# Patient Record
Sex: MALE | Race: WHITE | Employment: PART TIME | URBAN - METROPOLITAN AREA
[De-identification: names, ages, dates, MRNs, and addresses within clinical notes are randomized per-mention and may not be internally consistent; named-entity substitution may affect disease eponyms.]

---

## 2023-04-23 DIAGNOSIS — M54.9 CHRONIC BACK PAIN, UNSPECIFIED BACK LOCATION, UNSPECIFIED BACK PAIN LATERALITY: ICD-10-CM

## 2023-04-23 DIAGNOSIS — I10 PRIMARY HYPERTENSION: Primary | ICD-10-CM

## 2023-04-23 DIAGNOSIS — G89.29 CHRONIC BACK PAIN, UNSPECIFIED BACK LOCATION, UNSPECIFIED BACK PAIN LATERALITY: ICD-10-CM

## 2023-04-23 DIAGNOSIS — E78.2 MIXED HYPERLIPIDEMIA: ICD-10-CM

## 2023-04-24 RX ORDER — MELOXICAM 15 MG/1
1 TABLET ORAL DAILY PRN
COMMUNITY
Start: 2020-04-06 | End: 2023-04-24 | Stop reason: ALTCHOICE

## 2023-04-24 RX ORDER — PNV NO.95/FERROUS FUM/FOLIC AC 28MG-0.8MG
TABLET ORAL
COMMUNITY
Start: 2020-04-06 | End: 2023-05-15 | Stop reason: ALTCHOICE

## 2023-04-24 RX ORDER — CALC/MAG/B COMPLEX/D3/HERB 61
TABLET ORAL EVERY 12 HOURS
COMMUNITY
Start: 2021-09-23 | End: 2024-04-29 | Stop reason: SDUPTHER

## 2023-04-24 RX ORDER — DICLOFENAC SODIUM 10 MG/G
GEL TOPICAL
COMMUNITY
Start: 2022-07-27

## 2023-04-24 RX ORDER — DILTIAZEM HYDROCHLORIDE 180 MG/1
CAPSULE, EXTENDED RELEASE ORAL
Qty: 90 CAPSULE | Refills: 0 | Status: SHIPPED | OUTPATIENT
Start: 2023-04-24 | End: 2023-05-15 | Stop reason: SDUPTHER

## 2023-04-24 RX ORDER — ATORVASTATIN CALCIUM 10 MG/1
1 TABLET, FILM COATED ORAL DAILY
COMMUNITY
Start: 2020-05-19 | End: 2023-04-24 | Stop reason: ALTCHOICE

## 2023-04-24 RX ORDER — ATORVASTATIN CALCIUM 10 MG/1
TABLET, FILM COATED ORAL
Qty: 90 TABLET | Refills: 0 | Status: SHIPPED | OUTPATIENT
Start: 2023-04-24 | End: 2023-05-15 | Stop reason: SDUPTHER

## 2023-04-24 RX ORDER — TURMERIC ROOT EXTRACT 500 MG
TABLET ORAL
COMMUNITY
Start: 2020-04-06

## 2023-04-24 RX ORDER — DILTIAZEM HYDROCHLORIDE 180 MG/1
1 CAPSULE, EXTENDED RELEASE ORAL DAILY
COMMUNITY
Start: 2020-04-06 | End: 2023-04-24 | Stop reason: ALTCHOICE

## 2023-04-24 RX ORDER — MELOXICAM 15 MG/1
TABLET ORAL
Qty: 30 TABLET | Refills: 0 | Status: SHIPPED | OUTPATIENT
Start: 2023-04-24 | End: 2023-05-15 | Stop reason: SDUPTHER

## 2023-04-24 RX ORDER — MULTIVITAMIN
TABLET ORAL
COMMUNITY
Start: 2020-04-06

## 2023-04-28 PROBLEM — M19.90 OSTEOARTHRITIS: Status: ACTIVE | Noted: 2023-04-28

## 2023-04-28 PROBLEM — H61.23 BILATERAL IMPACTED CERUMEN: Status: ACTIVE | Noted: 2023-04-28

## 2023-04-28 PROBLEM — G56.02 CARPAL TUNNEL SYNDROME OF LEFT WRIST: Status: ACTIVE | Noted: 2023-04-28

## 2023-04-28 PROBLEM — E66.9 OBESITY: Status: ACTIVE | Noted: 2023-04-28

## 2023-04-28 PROBLEM — R52 WHOLE BODY PAIN: Status: ACTIVE | Noted: 2023-04-28

## 2023-04-28 PROBLEM — K21.9 ESOPHAGEAL REFLUX: Status: ACTIVE | Noted: 2023-04-28

## 2023-04-28 PROBLEM — E78.2 HYPERLIPIDEMIA, MIXED: Status: ACTIVE | Noted: 2023-04-28

## 2023-04-28 PROBLEM — R53.81 CHRONIC FATIGUE AND MALAISE: Status: ACTIVE | Noted: 2023-04-28

## 2023-04-28 PROBLEM — M79.641 PAIN IN BOTH HANDS: Status: ACTIVE | Noted: 2023-04-28

## 2023-04-28 PROBLEM — I10 BENIGN ESSENTIAL HYPERTENSION: Status: ACTIVE | Noted: 2023-04-28

## 2023-04-28 PROBLEM — E04.9 ENLARGED THYROID: Status: ACTIVE | Noted: 2023-04-28

## 2023-04-28 PROBLEM — R25.2 BILATERAL LEG CRAMPS: Status: ACTIVE | Noted: 2023-04-28

## 2023-04-28 PROBLEM — M79.642 PAIN IN BOTH HANDS: Status: ACTIVE | Noted: 2023-04-28

## 2023-04-28 PROBLEM — F32.A DEPRESSION: Status: ACTIVE | Noted: 2023-04-28

## 2023-04-28 PROBLEM — J30.9 RHINITIS, ALLERGIC: Status: ACTIVE | Noted: 2023-04-28

## 2023-04-28 PROBLEM — R53.82 CHRONIC FATIGUE AND MALAISE: Status: ACTIVE | Noted: 2023-04-28

## 2023-05-15 ENCOUNTER — TELEPHONE (OUTPATIENT)
Dept: PRIMARY CARE | Facility: CLINIC | Age: 58
End: 2023-05-15

## 2023-05-15 ENCOUNTER — LAB (OUTPATIENT)
Dept: LAB | Facility: LAB | Age: 58
End: 2023-05-15
Payer: COMMERCIAL

## 2023-05-15 ENCOUNTER — OFFICE VISIT (OUTPATIENT)
Dept: PRIMARY CARE | Facility: CLINIC | Age: 58
End: 2023-05-15
Payer: COMMERCIAL

## 2023-05-15 VITALS
SYSTOLIC BLOOD PRESSURE: 129 MMHG | OXYGEN SATURATION: 95 % | HEART RATE: 69 BPM | TEMPERATURE: 96.8 F | DIASTOLIC BLOOD PRESSURE: 95 MMHG | HEIGHT: 77 IN | WEIGHT: 235 LBS | BODY MASS INDEX: 27.75 KG/M2

## 2023-05-15 DIAGNOSIS — M79.641 PAIN IN BOTH HANDS: Primary | ICD-10-CM

## 2023-05-15 DIAGNOSIS — E78.2 HYPERLIPIDEMIA, MIXED: ICD-10-CM

## 2023-05-15 DIAGNOSIS — F32.A DEPRESSION, UNSPECIFIED DEPRESSION TYPE: ICD-10-CM

## 2023-05-15 DIAGNOSIS — E78.2 MIXED HYPERLIPIDEMIA: ICD-10-CM

## 2023-05-15 DIAGNOSIS — Z12.5 PROSTATE CANCER SCREENING: ICD-10-CM

## 2023-05-15 DIAGNOSIS — F41.9 ANXIETY: ICD-10-CM

## 2023-05-15 DIAGNOSIS — R53.82 CHRONIC FATIGUE AND MALAISE: ICD-10-CM

## 2023-05-15 DIAGNOSIS — R73.03 PREDIABETES: ICD-10-CM

## 2023-05-15 DIAGNOSIS — I10 PRIMARY HYPERTENSION: Primary | ICD-10-CM

## 2023-05-15 DIAGNOSIS — I10 PRIMARY HYPERTENSION: ICD-10-CM

## 2023-05-15 DIAGNOSIS — R53.81 CHRONIC FATIGUE AND MALAISE: ICD-10-CM

## 2023-05-15 DIAGNOSIS — M79.642 PAIN IN BOTH HANDS: Primary | ICD-10-CM

## 2023-05-15 DIAGNOSIS — G89.29 CHRONIC BACK PAIN, UNSPECIFIED BACK LOCATION, UNSPECIFIED BACK PAIN LATERALITY: ICD-10-CM

## 2023-05-15 DIAGNOSIS — M54.9 CHRONIC BACK PAIN, UNSPECIFIED BACK LOCATION, UNSPECIFIED BACK PAIN LATERALITY: ICD-10-CM

## 2023-05-15 DIAGNOSIS — I10 BENIGN ESSENTIAL HYPERTENSION: ICD-10-CM

## 2023-05-15 LAB
ALANINE AMINOTRANSFERASE (SGPT) (U/L) IN SER/PLAS: 22 U/L (ref 10–52)
ALBUMIN (G/DL) IN SER/PLAS: 4.4 G/DL (ref 3.4–5)
ALKALINE PHOSPHATASE (U/L) IN SER/PLAS: 49 U/L (ref 33–120)
ANION GAP IN SER/PLAS: 10 MMOL/L (ref 10–20)
ASPARTATE AMINOTRANSFERASE (SGOT) (U/L) IN SER/PLAS: 18 U/L (ref 9–39)
BILIRUBIN TOTAL (MG/DL) IN SER/PLAS: 0.5 MG/DL (ref 0–1.2)
CALCIUM (MG/DL) IN SER/PLAS: 9.3 MG/DL (ref 8.6–10.3)
CARBON DIOXIDE, TOTAL (MMOL/L) IN SER/PLAS: 29 MMOL/L (ref 21–32)
CHLORIDE (MMOL/L) IN SER/PLAS: 104 MMOL/L (ref 98–107)
CHOLESTEROL (MG/DL) IN SER/PLAS: 146 MG/DL (ref 0–199)
CHOLESTEROL IN HDL (MG/DL) IN SER/PLAS: 44.1 MG/DL
CHOLESTEROL/HDL RATIO: 3.3
CREATININE (MG/DL) IN SER/PLAS: 0.91 MG/DL (ref 0.5–1.3)
ERYTHROCYTE DISTRIBUTION WIDTH (RATIO) BY AUTOMATED COUNT: 12.5 % (ref 11.5–14.5)
ERYTHROCYTE MEAN CORPUSCULAR HEMOGLOBIN CONCENTRATION (G/DL) BY AUTOMATED: 33.7 G/DL (ref 32–36)
ERYTHROCYTE MEAN CORPUSCULAR VOLUME (FL) BY AUTOMATED COUNT: 85 FL (ref 80–100)
ERYTHROCYTES (10*6/UL) IN BLOOD BY AUTOMATED COUNT: 5.6 X10E12/L (ref 4.5–5.9)
GFR MALE: >90 ML/MIN/1.73M2
GLUCOSE (MG/DL) IN SER/PLAS: 84 MG/DL (ref 74–99)
HEMATOCRIT (%) IN BLOOD BY AUTOMATED COUNT: 47.8 % (ref 41–52)
HEMOGLOBIN (G/DL) IN BLOOD: 16.1 G/DL (ref 13.5–17.5)
LDL: 66 MG/DL (ref 0–99)
LEUKOCYTES (10*3/UL) IN BLOOD BY AUTOMATED COUNT: 6.7 X10E9/L (ref 4.4–11.3)
PLATELETS (10*3/UL) IN BLOOD AUTOMATED COUNT: 263 X10E9/L (ref 150–450)
POTASSIUM (MMOL/L) IN SER/PLAS: 4.2 MMOL/L (ref 3.5–5.3)
PROSTATE SPECIFIC ANTIGEN,SCREEN: 0.3 NG/ML (ref 0–4)
PROTEIN TOTAL: 7 G/DL (ref 6.4–8.2)
SODIUM (MMOL/L) IN SER/PLAS: 139 MMOL/L (ref 136–145)
TRIGLYCERIDE (MG/DL) IN SER/PLAS: 181 MG/DL (ref 0–149)
UREA NITROGEN (MG/DL) IN SER/PLAS: 15 MG/DL (ref 6–23)
VLDL: 36 MG/DL (ref 0–40)

## 2023-05-15 PROCEDURE — 80061 LIPID PANEL: CPT

## 2023-05-15 PROCEDURE — 99214 OFFICE O/P EST MOD 30 MIN: CPT | Performed by: STUDENT IN AN ORGANIZED HEALTH CARE EDUCATION/TRAINING PROGRAM

## 2023-05-15 PROCEDURE — 80053 COMPREHEN METABOLIC PANEL: CPT

## 2023-05-15 PROCEDURE — 3074F SYST BP LT 130 MM HG: CPT | Performed by: STUDENT IN AN ORGANIZED HEALTH CARE EDUCATION/TRAINING PROGRAM

## 2023-05-15 PROCEDURE — 83036 HEMOGLOBIN GLYCOSYLATED A1C: CPT

## 2023-05-15 PROCEDURE — 84153 ASSAY OF PSA TOTAL: CPT

## 2023-05-15 PROCEDURE — 3080F DIAST BP >= 90 MM HG: CPT | Performed by: STUDENT IN AN ORGANIZED HEALTH CARE EDUCATION/TRAINING PROGRAM

## 2023-05-15 PROCEDURE — 36415 COLL VENOUS BLD VENIPUNCTURE: CPT

## 2023-05-15 PROCEDURE — 85027 COMPLETE CBC AUTOMATED: CPT

## 2023-05-15 RX ORDER — MELOXICAM 15 MG/1
1 TABLET ORAL DAILY
COMMUNITY
Start: 2019-02-14 | End: 2024-04-29 | Stop reason: ALTCHOICE

## 2023-05-15 RX ORDER — ALPRAZOLAM 0.25 MG/1
TABLET ORAL
COMMUNITY
Start: 2018-02-21 | End: 2024-04-29 | Stop reason: ALTCHOICE

## 2023-05-15 RX ORDER — SERTRALINE HYDROCHLORIDE 50 MG/1
50 TABLET, FILM COATED ORAL 2 TIMES DAILY
Qty: 90 TABLET | Refills: 1 | Status: CANCELLED | OUTPATIENT
Start: 2023-05-15

## 2023-05-15 RX ORDER — ATORVASTATIN CALCIUM 10 MG/1
10 TABLET, FILM COATED ORAL DAILY
Qty: 90 TABLET | Refills: 1 | Status: SHIPPED | OUTPATIENT
Start: 2023-05-15 | End: 2023-10-04

## 2023-05-15 RX ORDER — MELOXICAM 15 MG/1
15 TABLET ORAL DAILY
Qty: 90 TABLET | Refills: 1 | Status: CANCELLED | OUTPATIENT
Start: 2023-05-15

## 2023-05-15 RX ORDER — ATORVASTATIN CALCIUM 10 MG/1
10 TABLET, FILM COATED ORAL DAILY
Qty: 90 TABLET | Refills: 1 | Status: SHIPPED | OUTPATIENT
Start: 2023-05-15 | End: 2023-05-15 | Stop reason: SDUPTHER

## 2023-05-15 RX ORDER — DILTIAZEM HYDROCHLORIDE 180 MG/1
180 CAPSULE, EXTENDED RELEASE ORAL DAILY
Qty: 90 CAPSULE | Refills: 1 | Status: CANCELLED | OUTPATIENT
Start: 2023-05-15

## 2023-05-15 RX ORDER — MELOXICAM 15 MG/1
15 TABLET ORAL DAILY PRN
Qty: 90 TABLET | Refills: 1 | Status: SHIPPED | OUTPATIENT
Start: 2023-05-15 | End: 2023-10-04

## 2023-05-15 RX ORDER — GLUCOSAMINE/CHONDRO SU A 500-400 MG
1 TABLET ORAL EVERY OTHER DAY
COMMUNITY

## 2023-05-15 RX ORDER — SERTRALINE HYDROCHLORIDE 50 MG/1
100 TABLET, FILM COATED ORAL DAILY
Qty: 90 TABLET | Refills: 1 | Status: SHIPPED | OUTPATIENT
Start: 2023-05-15 | End: 2023-05-15 | Stop reason: ALTCHOICE

## 2023-05-15 RX ORDER — MELOXICAM 15 MG/1
15 TABLET ORAL DAILY PRN
Qty: 90 TABLET | Refills: 1 | Status: SHIPPED | OUTPATIENT
Start: 2023-05-15 | End: 2023-05-15 | Stop reason: ALTCHOICE

## 2023-05-15 RX ORDER — SERTRALINE HYDROCHLORIDE 50 MG/1
100 TABLET, FILM COATED ORAL DAILY
Qty: 90 TABLET | Refills: 1 | Status: SHIPPED | OUTPATIENT
Start: 2023-05-15 | End: 2023-08-10

## 2023-05-15 RX ORDER — DILTIAZEM HYDROCHLORIDE 180 MG/1
180 CAPSULE, EXTENDED RELEASE ORAL DAILY
Qty: 90 CAPSULE | Refills: 1 | Status: SHIPPED | OUTPATIENT
Start: 2023-05-15 | End: 2023-05-15 | Stop reason: ALTCHOICE

## 2023-05-15 RX ORDER — LANOLIN ALCOHOL/MO/W.PET/CERES
500 CREAM (GRAM) TOPICAL DAILY
COMMUNITY

## 2023-05-15 RX ORDER — SERTRALINE HYDROCHLORIDE 50 MG/1
1 TABLET, FILM COATED ORAL 2 TIMES DAILY
COMMUNITY
Start: 2018-01-08 | End: 2024-04-29 | Stop reason: ALTCHOICE

## 2023-05-15 RX ORDER — DILTIAZEM HYDROCHLORIDE 180 MG/1
1 CAPSULE, EXTENDED RELEASE ORAL DAILY
COMMUNITY
Start: 2018-01-24 | End: 2023-10-04 | Stop reason: ALTCHOICE

## 2023-05-15 RX ORDER — DILTIAZEM HYDROCHLORIDE 180 MG/1
180 CAPSULE, EXTENDED RELEASE ORAL DAILY
Qty: 90 CAPSULE | Refills: 1 | Status: SHIPPED | OUTPATIENT
Start: 2023-05-15 | End: 2023-10-04

## 2023-05-15 RX ORDER — ASCORBIC ACID 500 MG
TABLET,CHEWABLE ORAL
COMMUNITY
End: 2024-04-29 | Stop reason: ALTCHOICE

## 2023-05-15 RX ORDER — ATORVASTATIN CALCIUM 10 MG/1
10 TABLET, FILM COATED ORAL DAILY
Qty: 90 TABLET | Refills: 1 | Status: CANCELLED | OUTPATIENT
Start: 2023-05-15

## 2023-05-15 RX ORDER — PYRIDOXINE HCL (VITAMIN B6) 250 MG
50 TABLET ORAL DAILY
COMMUNITY

## 2023-05-15 SDOH — ECONOMIC STABILITY: FOOD INSECURITY: WITHIN THE PAST 12 MONTHS, THE FOOD YOU BOUGHT JUST DIDN'T LAST AND YOU DIDN'T HAVE MONEY TO GET MORE.: NEVER TRUE

## 2023-05-15 SDOH — ECONOMIC STABILITY: FOOD INSECURITY: WITHIN THE PAST 12 MONTHS, YOU WORRIED THAT YOUR FOOD WOULD RUN OUT BEFORE YOU GOT MONEY TO BUY MORE.: NEVER TRUE

## 2023-05-15 ASSESSMENT — ENCOUNTER SYMPTOMS
FATIGUE: 0
NAUSEA: 0
COLOR CHANGE: 0
CONFUSION: 0
UNEXPECTED WEIGHT CHANGE: 0
ABDOMINAL PAIN: 0
PALPITATIONS: 0
OCCASIONAL FEELINGS OF UNSTEADINESS: 0
DIARRHEA: 0
HEADACHES: 0
CHILLS: 0
SHORTNESS OF BREATH: 0
CONSTIPATION: 0
DIZZINESS: 0
WHEEZING: 0
COUGH: 0
FEVER: 0
MUSCULOSKELETAL NEGATIVE: 1
VOMITING: 0

## 2023-05-15 ASSESSMENT — LIFESTYLE VARIABLES
HOW MANY STANDARD DRINKS CONTAINING ALCOHOL DO YOU HAVE ON A TYPICAL DAY: 3 OR 4
HOW OFTEN DO YOU HAVE SIX OR MORE DRINKS ON ONE OCCASION: NEVER
SKIP TO QUESTIONS 9-10: 0
HOW OFTEN DO YOU HAVE A DRINK CONTAINING ALCOHOL: 2-4 TIMES A MONTH
AUDIT-C TOTAL SCORE: 3

## 2023-05-15 ASSESSMENT — PATIENT HEALTH QUESTIONNAIRE - PHQ9
1. LITTLE INTEREST OR PLEASURE IN DOING THINGS: NOT AT ALL
SUM OF ALL RESPONSES TO PHQ9 QUESTIONS 1 & 2: 0
2. FEELING DOWN, DEPRESSED OR HOPELESS: NOT AT ALL

## 2023-05-15 NOTE — TELEPHONE ENCOUNTER
Pt's wife called in and stated that scripts that were called in during appointment on 5/15 were sent to wrong pharmacy. Scripts need to be sent to [facility] and for 90 days.

## 2023-05-16 LAB
ESTIMATED AVERAGE GLUCOSE FOR HBA1C: 128 MG/DL
HEMOGLOBIN A1C/HEMOGLOBIN TOTAL IN BLOOD: 6.1 %

## 2023-08-09 DIAGNOSIS — F41.9 ANXIETY: ICD-10-CM

## 2023-08-10 RX ORDER — SERTRALINE HYDROCHLORIDE 50 MG/1
100 TABLET, FILM COATED ORAL DAILY
Qty: 90 TABLET | Refills: 7 | Status: SHIPPED | OUTPATIENT
Start: 2023-08-10 | End: 2024-04-29 | Stop reason: SDUPTHER

## 2023-10-02 DIAGNOSIS — M54.9 CHRONIC BACK PAIN, UNSPECIFIED BACK LOCATION, UNSPECIFIED BACK PAIN LATERALITY: ICD-10-CM

## 2023-10-02 DIAGNOSIS — G89.29 CHRONIC BACK PAIN, UNSPECIFIED BACK LOCATION, UNSPECIFIED BACK PAIN LATERALITY: ICD-10-CM

## 2023-10-03 DIAGNOSIS — I10 PRIMARY HYPERTENSION: ICD-10-CM

## 2023-10-03 DIAGNOSIS — E78.2 MIXED HYPERLIPIDEMIA: ICD-10-CM

## 2023-10-04 RX ORDER — DILTIAZEM HYDROCHLORIDE 180 MG/1
180 CAPSULE, EXTENDED RELEASE ORAL DAILY
Qty: 90 CAPSULE | Refills: 0 | Status: SHIPPED | OUTPATIENT
Start: 2023-10-04 | End: 2024-04-29 | Stop reason: SDUPTHER

## 2023-10-04 RX ORDER — MELOXICAM 15 MG/1
TABLET ORAL
Qty: 90 TABLET | Refills: 0 | Status: SHIPPED | OUTPATIENT
Start: 2023-10-04 | End: 2024-04-29 | Stop reason: SDUPTHER

## 2023-10-04 RX ORDER — ATORVASTATIN CALCIUM 10 MG/1
10 TABLET, FILM COATED ORAL DAILY
Qty: 90 TABLET | Refills: 0 | Status: SHIPPED | OUTPATIENT
Start: 2023-10-04 | End: 2024-04-29 | Stop reason: SDUPTHER

## 2023-11-20 ENCOUNTER — APPOINTMENT (OUTPATIENT)
Dept: PRIMARY CARE | Facility: CLINIC | Age: 58
End: 2023-11-20
Payer: COMMERCIAL

## 2023-12-07 LAB
NONINV COLON CA DNA+OCC BLD SCRN STL QL: NEGATIVE
NONINV COLON CA DNA+OCC BLD SCRN STL-IMP: NORMAL

## 2024-04-29 ENCOUNTER — OFFICE VISIT (OUTPATIENT)
Dept: PRIMARY CARE | Facility: CLINIC | Age: 59
End: 2024-04-29
Payer: COMMERCIAL

## 2024-04-29 ENCOUNTER — APPOINTMENT (OUTPATIENT)
Dept: PRIMARY CARE | Facility: CLINIC | Age: 59
End: 2024-04-29
Payer: COMMERCIAL

## 2024-04-29 VITALS
SYSTOLIC BLOOD PRESSURE: 134 MMHG | DIASTOLIC BLOOD PRESSURE: 82 MMHG | RESPIRATION RATE: 16 BRPM | HEIGHT: 72 IN | HEART RATE: 87 BPM | TEMPERATURE: 97.8 F | WEIGHT: 233 LBS | OXYGEN SATURATION: 95 % | BODY MASS INDEX: 31.56 KG/M2

## 2024-04-29 DIAGNOSIS — M54.9 CHRONIC BACK PAIN, UNSPECIFIED BACK LOCATION, UNSPECIFIED BACK PAIN LATERALITY: ICD-10-CM

## 2024-04-29 DIAGNOSIS — R53.83 FATIGUE, UNSPECIFIED TYPE: ICD-10-CM

## 2024-04-29 DIAGNOSIS — E78.2 MIXED HYPERLIPIDEMIA: ICD-10-CM

## 2024-04-29 DIAGNOSIS — I10 PRIMARY HYPERTENSION: Primary | ICD-10-CM

## 2024-04-29 DIAGNOSIS — G89.29 CHRONIC BACK PAIN, UNSPECIFIED BACK LOCATION, UNSPECIFIED BACK PAIN LATERALITY: ICD-10-CM

## 2024-04-29 DIAGNOSIS — F41.9 ANXIETY: ICD-10-CM

## 2024-04-29 DIAGNOSIS — R73.03 PREDIABETES: ICD-10-CM

## 2024-04-29 DIAGNOSIS — K21.9 GASTROESOPHAGEAL REFLUX DISEASE, UNSPECIFIED WHETHER ESOPHAGITIS PRESENT: ICD-10-CM

## 2024-04-29 PROCEDURE — 3079F DIAST BP 80-89 MM HG: CPT | Performed by: STUDENT IN AN ORGANIZED HEALTH CARE EDUCATION/TRAINING PROGRAM

## 2024-04-29 PROCEDURE — 99214 OFFICE O/P EST MOD 30 MIN: CPT | Performed by: STUDENT IN AN ORGANIZED HEALTH CARE EDUCATION/TRAINING PROGRAM

## 2024-04-29 PROCEDURE — 3075F SYST BP GE 130 - 139MM HG: CPT | Performed by: STUDENT IN AN ORGANIZED HEALTH CARE EDUCATION/TRAINING PROGRAM

## 2024-04-29 RX ORDER — DILTIAZEM HYDROCHLORIDE 180 MG/1
180 CAPSULE, EXTENDED RELEASE ORAL DAILY
Qty: 90 CAPSULE | Refills: 1 | Status: SHIPPED | OUTPATIENT
Start: 2024-04-29

## 2024-04-29 RX ORDER — LANSOPRAZOLE 30 MG/1
30 CAPSULE, DELAYED RELEASE ORAL
Qty: 90 CAPSULE | Refills: 1 | Status: SHIPPED | OUTPATIENT
Start: 2024-04-29

## 2024-04-29 RX ORDER — SERTRALINE HYDROCHLORIDE 100 MG/1
100 TABLET, FILM COATED ORAL DAILY
Qty: 90 TABLET | Refills: 1 | Status: SHIPPED | OUTPATIENT
Start: 2024-04-29

## 2024-04-29 RX ORDER — MELOXICAM 15 MG/1
TABLET ORAL
Qty: 90 TABLET | Refills: 0 | Status: SHIPPED | OUTPATIENT
Start: 2024-04-29

## 2024-04-29 RX ORDER — ATORVASTATIN CALCIUM 10 MG/1
10 TABLET, FILM COATED ORAL DAILY
Qty: 90 TABLET | Refills: 0 | Status: SHIPPED | OUTPATIENT
Start: 2024-04-29

## 2024-04-29 SDOH — ECONOMIC STABILITY: FOOD INSECURITY: WITHIN THE PAST 12 MONTHS, THE FOOD YOU BOUGHT JUST DIDN'T LAST AND YOU DIDN'T HAVE MONEY TO GET MORE.: NEVER TRUE

## 2024-04-29 SDOH — ECONOMIC STABILITY: FOOD INSECURITY: WITHIN THE PAST 12 MONTHS, YOU WORRIED THAT YOUR FOOD WOULD RUN OUT BEFORE YOU GOT MONEY TO BUY MORE.: NEVER TRUE

## 2024-04-29 ASSESSMENT — ENCOUNTER SYMPTOMS
PALPITATIONS: 0
DIARRHEA: 0
WHEEZING: 0
SHORTNESS OF BREATH: 0
HEADACHES: 0
VOMITING: 0
ABDOMINAL PAIN: 1
COLOR CHANGE: 0
ARTHRALGIAS: 1
CONFUSION: 0
UNEXPECTED WEIGHT CHANGE: 0
DIZZINESS: 0
FATIGUE: 0
CHILLS: 0
NAUSEA: 0
CONSTIPATION: 0
FEVER: 1
COUGH: 0

## 2024-04-29 ASSESSMENT — PAIN SCALES - GENERAL: PAINLEVEL: 0-NO PAIN

## 2024-04-29 ASSESSMENT — LIFESTYLE VARIABLES
HOW MANY STANDARD DRINKS CONTAINING ALCOHOL DO YOU HAVE ON A TYPICAL DAY: 1 OR 2
HOW OFTEN DO YOU HAVE A DRINK CONTAINING ALCOHOL: 2-4 TIMES A MONTH
SKIP TO QUESTIONS 9-10: 1
AUDIT-C TOTAL SCORE: 2
HOW OFTEN DO YOU HAVE SIX OR MORE DRINKS ON ONE OCCASION: NEVER

## 2024-04-29 ASSESSMENT — PATIENT HEALTH QUESTIONNAIRE - PHQ9
2. FEELING DOWN, DEPRESSED OR HOPELESS: NOT AT ALL
1. LITTLE INTEREST OR PLEASURE IN DOING THINGS: NOT AT ALL
SUM OF ALL RESPONSES TO PHQ9 QUESTIONS 1 & 2: 0

## 2024-04-29 NOTE — PROGRESS NOTES
Subjective   Patient ID: Kamille Cotto Sr. is a 58 y.o. male who presents for Follow-up (Patient is here for a follow up and med refills.  Patient is still having fatigue issues.), Earache (Patient is having an issue with right ear.), and GI Problem (Pt is having a sharp pain in stomach.  This has been going on for the past 2 months.).    HPI   He is here for Fu visit & few complaints. Accompanied by wife - Melvina.   Reports having general fatigue, feels low energy level. Also reports R ear issues, feels something there; tried debrox w/ some help but cam back again. Wife states his hearing has been worsened however pt feels fine, declined seeing audiologist for now.   Reports sharp pain on his abd, most on the R side abd usually after meal; had cholecystectomy 4 yrs ago. Comes on & off & not regular. Denies N/V, diarrhea.      # HLD # HTN # SVT   - io BP was 134/82 with HR 87      - takes diltZ 180 mg daily, req refills   - takes atorva 10 mg daily w/o issues      # Anxiety/Depression   - reports stable mood, at LOV sertraline was inc to 100 mg.    - denies SI/HI and other concerns      # chronic joint pain   - reports his chronic pain on his hands and some times the knee but its sl better now as its getting warmer   - takes mobic 15 mg daily prn w/ help. Uses volteran gel as needed        Review of Systems   Constitutional:  Positive for fever. Negative for chills, fatigue and unexpected weight change.   HENT: Negative.     Respiratory:  Negative for cough, shortness of breath and wheezing.    Cardiovascular:  Negative for chest pain, palpitations and leg swelling.   Gastrointestinal:  Positive for abdominal pain. Negative for constipation, diarrhea, nausea and vomiting.   Musculoskeletal:  Positive for arthralgias.   Skin:  Negative for color change and rash.   Neurological:  Negative for dizziness and headaches.   Psychiatric/Behavioral:  Negative for behavioral problems and confusion.        Objective   BP  134/82 (BP Location: Right arm, Patient Position: Sitting, BP Cuff Size: Adult)   Pulse 87   Temp 36.6 °C (97.8 °F) (Temporal)   Resp 16   Ht 1.829 m (6')   Wt 106 kg (233 lb)   SpO2 95%   BMI 31.60 kg/m²     Physical Exam  Vitals and nursing note reviewed.   Constitutional:       Appearance: Normal appearance. He is obese.      Comments: Wife in the room.    HENT:      Right Ear: Tympanic membrane and ear canal normal.      Left Ear: Tympanic membrane and ear canal normal.   Cardiovascular:      Rate and Rhythm: Normal rate and regular rhythm.      Pulses: Normal pulses.      Heart sounds: Normal heart sounds.   Pulmonary:      Effort: Pulmonary effort is normal. No respiratory distress.      Breath sounds: Normal breath sounds.   Abdominal:      General: Abdomen is flat. Bowel sounds are normal.      Palpations: Abdomen is soft.      Tenderness: There is no abdominal tenderness. There is no right CVA tenderness, left CVA tenderness, guarding or rebound.      Hernia: No hernia is present.   Musculoskeletal:         General: Normal range of motion.   Neurological:      General: No focal deficit present.      Mental Status: He is alert.   Psychiatric:         Mood and Affect: Mood normal.         Behavior: Behavior normal.       Assessment/Plan   He is here for Fu visit & few complaints. Accompanied by wife - Melvina.   Having generalized fatigue, could be metabolic vs other as depression; obtain bld work to eval further. Bl ears appeared normal, no signs of acute infections. Declined audiologist visit.   He abd exam pretty normal, not ttp and hernia/mass felt during the exam; could be from gastritis vs other; cont prevacid 30 mg dialy; rx renewed (if not covered, will cont OTC 15 mg 2 caps daily). May consider abd US/CT if not better/other at NOV or sooner as needed. Other chronic problems been stable as below.      # HLD # HTN # SVT   - BP remains controlled       - cont diltZ 180 mg daily as usual   - cont  atorva 10 mg daily   - encourage heart healthy & DASH diet; continue exercise as tolerated, at least 150 mins per wk   - BW per emr, will call for any abn results as indicated; pt made aware      # Anxiety/Depression   -stable mood on sertraline 100 mg daily, cont same.   - no SI/HI noted      # chronic joint pain   - pain stable on mobic 15 mg daily as needed; cont same. Cont volteran gel as needed     Problem List Items Addressed This Visit             ICD-10-CM    Esophageal reflux K21.9    Relevant Medications    lansoprazole (Prevacid) 30 mg DR capsule     Other Visit Diagnoses         Codes    Primary hypertension    -  Primary I10    Relevant Medications    dilTIAZem XR (DILT-XR) 180 mg 24 hr capsule    Other Relevant Orders    Comprehensive Metabolic Panel    CBC and Auto Differential    Anxiety     F41.9    Relevant Medications    sertraline (Zoloft) 100 mg tablet    Mixed hyperlipidemia     E78.2    Relevant Medications    atorvastatin (Lipitor) 10 mg tablet    Other Relevant Orders    Lipid Panel    Chronic back pain, unspecified back location, unspecified back pain laterality     M54.9, G89.29    Relevant Medications    meloxicam (Mobic) 15 mg tablet    Prediabetes     R73.03    Relevant Orders    Hemoglobin A1c    Fatigue, unspecified type     R53.83    Relevant Orders    Tsh With Reflex To Free T4 If Abnormal    Vitamin B12          Rtc 5-6 mo for hm/FU    Ricardo Ramírez MD    [facility], [facility]

## 2024-04-30 ENCOUNTER — LAB (OUTPATIENT)
Dept: LAB | Facility: LAB | Age: 59
End: 2024-04-30
Payer: COMMERCIAL

## 2024-04-30 DIAGNOSIS — I10 PRIMARY HYPERTENSION: ICD-10-CM

## 2024-04-30 DIAGNOSIS — R53.83 FATIGUE, UNSPECIFIED TYPE: ICD-10-CM

## 2024-04-30 DIAGNOSIS — R73.03 PREDIABETES: ICD-10-CM

## 2024-04-30 DIAGNOSIS — E78.2 MIXED HYPERLIPIDEMIA: ICD-10-CM

## 2024-04-30 LAB
ALBUMIN SERPL BCP-MCNC: 4.4 G/DL (ref 3.4–5)
ALP SERPL-CCNC: 46 U/L (ref 33–120)
ALT SERPL W P-5'-P-CCNC: 22 U/L (ref 10–52)
ANION GAP SERPL CALC-SCNC: 12 MMOL/L (ref 10–20)
AST SERPL W P-5'-P-CCNC: 19 U/L (ref 9–39)
BASOPHILS # BLD AUTO: 0.05 X10*3/UL (ref 0–0.1)
BASOPHILS NFR BLD AUTO: 0.8 %
BILIRUB SERPL-MCNC: 0.5 MG/DL (ref 0–1.2)
BUN SERPL-MCNC: 12 MG/DL (ref 6–23)
CALCIUM SERPL-MCNC: 9.4 MG/DL (ref 8.6–10.3)
CHLORIDE SERPL-SCNC: 105 MMOL/L (ref 98–107)
CHOLEST SERPL-MCNC: 145 MG/DL (ref 0–199)
CHOLESTEROL/HDL RATIO: 3.8
CO2 SERPL-SCNC: 25 MMOL/L (ref 21–32)
CREAT SERPL-MCNC: 1.03 MG/DL (ref 0.5–1.3)
EGFRCR SERPLBLD CKD-EPI 2021: 84 ML/MIN/1.73M*2
EOSINOPHIL # BLD AUTO: 0.06 X10*3/UL (ref 0–0.7)
EOSINOPHIL NFR BLD AUTO: 1 %
ERYTHROCYTE [DISTWIDTH] IN BLOOD BY AUTOMATED COUNT: 12.6 % (ref 11.5–14.5)
GLUCOSE SERPL-MCNC: 122 MG/DL (ref 74–99)
HCT VFR BLD AUTO: 47.3 % (ref 41–52)
HDLC SERPL-MCNC: 38.3 MG/DL
HGB BLD-MCNC: 16.3 G/DL (ref 13.5–17.5)
IMM GRANULOCYTES # BLD AUTO: 0.04 X10*3/UL (ref 0–0.7)
IMM GRANULOCYTES NFR BLD AUTO: 0.7 % (ref 0–0.9)
LDLC SERPL CALC-MCNC: 63 MG/DL
LYMPHOCYTES # BLD AUTO: 1.74 X10*3/UL (ref 1.2–4.8)
LYMPHOCYTES NFR BLD AUTO: 29.4 %
MCH RBC QN AUTO: 29.2 PG (ref 26–34)
MCHC RBC AUTO-ENTMCNC: 34.5 G/DL (ref 32–36)
MCV RBC AUTO: 85 FL (ref 80–100)
MONOCYTES # BLD AUTO: 0.48 X10*3/UL (ref 0.1–1)
MONOCYTES NFR BLD AUTO: 8.1 %
NEUTROPHILS # BLD AUTO: 3.55 X10*3/UL (ref 1.2–7.7)
NEUTROPHILS NFR BLD AUTO: 60 %
NON HDL CHOLESTEROL: 107 MG/DL (ref 0–149)
NRBC BLD-RTO: 0 /100 WBCS (ref 0–0)
PLATELET # BLD AUTO: 250 X10*3/UL (ref 150–450)
POTASSIUM SERPL-SCNC: 4.1 MMOL/L (ref 3.5–5.3)
PROT SERPL-MCNC: 7.1 G/DL (ref 6.4–8.2)
RBC # BLD AUTO: 5.59 X10*6/UL (ref 4.5–5.9)
SODIUM SERPL-SCNC: 138 MMOL/L (ref 136–145)
TRIGL SERPL-MCNC: 219 MG/DL (ref 0–149)
TSH SERPL-ACNC: 0.82 MIU/L (ref 0.44–3.98)
VIT B12 SERPL-MCNC: 414 PG/ML (ref 211–911)
VLDL: 44 MG/DL (ref 0–40)
WBC # BLD AUTO: 5.9 X10*3/UL (ref 4.4–11.3)

## 2024-04-30 PROCEDURE — 80053 COMPREHEN METABOLIC PANEL: CPT

## 2024-04-30 PROCEDURE — 36415 COLL VENOUS BLD VENIPUNCTURE: CPT

## 2024-04-30 PROCEDURE — 84443 ASSAY THYROID STIM HORMONE: CPT

## 2024-04-30 PROCEDURE — 85025 COMPLETE CBC W/AUTO DIFF WBC: CPT

## 2024-04-30 PROCEDURE — 83036 HEMOGLOBIN GLYCOSYLATED A1C: CPT

## 2024-04-30 PROCEDURE — 82607 VITAMIN B-12: CPT

## 2024-04-30 PROCEDURE — 80061 LIPID PANEL: CPT

## 2024-05-02 LAB
EST. AVERAGE GLUCOSE BLD GHB EST-MCNC: 123 MG/DL
HBA1C MFR BLD: 5.9 %

## 2024-06-23 DIAGNOSIS — E78.2 MIXED HYPERLIPIDEMIA: ICD-10-CM

## 2024-06-23 DIAGNOSIS — M54.9 CHRONIC BACK PAIN, UNSPECIFIED BACK LOCATION, UNSPECIFIED BACK PAIN LATERALITY: ICD-10-CM

## 2024-06-23 DIAGNOSIS — G89.29 CHRONIC BACK PAIN, UNSPECIFIED BACK LOCATION, UNSPECIFIED BACK PAIN LATERALITY: ICD-10-CM

## 2024-06-25 RX ORDER — ATORVASTATIN CALCIUM 10 MG/1
10 TABLET, FILM COATED ORAL DAILY
Qty: 90 TABLET | Refills: 1 | Status: SHIPPED | OUTPATIENT
Start: 2024-06-25

## 2024-06-25 RX ORDER — MELOXICAM 15 MG/1
TABLET ORAL
Qty: 90 TABLET | Refills: 0 | Status: SHIPPED | OUTPATIENT
Start: 2024-06-25

## 2024-09-03 DIAGNOSIS — M54.9 CHRONIC BACK PAIN, UNSPECIFIED BACK LOCATION, UNSPECIFIED BACK PAIN LATERALITY: ICD-10-CM

## 2024-09-03 DIAGNOSIS — G89.29 CHRONIC BACK PAIN, UNSPECIFIED BACK LOCATION, UNSPECIFIED BACK PAIN LATERALITY: ICD-10-CM

## 2024-09-04 RX ORDER — MELOXICAM 15 MG/1
TABLET ORAL
Qty: 90 TABLET | Refills: 0 | Status: SHIPPED | OUTPATIENT
Start: 2024-09-04

## 2024-09-16 DIAGNOSIS — I10 PRIMARY HYPERTENSION: ICD-10-CM

## 2024-09-16 DIAGNOSIS — F41.9 ANXIETY: ICD-10-CM

## 2024-10-02 RX ORDER — SERTRALINE HYDROCHLORIDE 100 MG/1
100 TABLET, FILM COATED ORAL DAILY
Qty: 90 TABLET | Refills: 0 | Status: SHIPPED | OUTPATIENT
Start: 2024-10-02

## 2024-10-02 RX ORDER — DILTIAZEM HYDROCHLORIDE 180 MG/1
180 CAPSULE, EXTENDED RELEASE ORAL DAILY
Qty: 90 CAPSULE | Refills: 0 | Status: SHIPPED | OUTPATIENT
Start: 2024-10-02

## 2024-10-21 ENCOUNTER — APPOINTMENT (OUTPATIENT)
Dept: PRIMARY CARE | Facility: CLINIC | Age: 59
End: 2024-10-21
Payer: COMMERCIAL

## 2024-10-21 VITALS
SYSTOLIC BLOOD PRESSURE: 118 MMHG | HEIGHT: 72 IN | OXYGEN SATURATION: 95 % | DIASTOLIC BLOOD PRESSURE: 76 MMHG | BODY MASS INDEX: 31.34 KG/M2 | WEIGHT: 231.4 LBS | TEMPERATURE: 97.1 F | HEART RATE: 83 BPM | RESPIRATION RATE: 20 BRPM

## 2024-10-21 DIAGNOSIS — I10 PRIMARY HYPERTENSION: ICD-10-CM

## 2024-10-21 DIAGNOSIS — E78.2 MIXED HYPERLIPIDEMIA: ICD-10-CM

## 2024-10-21 DIAGNOSIS — F41.9 ANXIETY: ICD-10-CM

## 2024-10-21 DIAGNOSIS — G89.29 CHRONIC LEFT HIP PAIN: ICD-10-CM

## 2024-10-21 DIAGNOSIS — M25.552 CHRONIC LEFT HIP PAIN: ICD-10-CM

## 2024-10-21 DIAGNOSIS — G89.29 CHRONIC BACK PAIN, UNSPECIFIED BACK LOCATION, UNSPECIFIED BACK PAIN LATERALITY: ICD-10-CM

## 2024-10-21 DIAGNOSIS — R73.03 PREDIABETES: ICD-10-CM

## 2024-10-21 DIAGNOSIS — Z00.00 ROUTINE GENERAL MEDICAL EXAMINATION AT A HEALTH CARE FACILITY: Primary | ICD-10-CM

## 2024-10-21 DIAGNOSIS — M54.9 CHRONIC BACK PAIN, UNSPECIFIED BACK LOCATION, UNSPECIFIED BACK PAIN LATERALITY: ICD-10-CM

## 2024-10-21 DIAGNOSIS — Z12.5 PROSTATE CANCER SCREENING: ICD-10-CM

## 2024-10-21 DIAGNOSIS — K21.9 GASTROESOPHAGEAL REFLUX DISEASE, UNSPECIFIED WHETHER ESOPHAGITIS PRESENT: ICD-10-CM

## 2024-10-21 PROBLEM — U07.1 DISEASE DUE TO SEVERE ACUTE RESPIRATORY SYNDROME CORONAVIRUS 2 (SARS-COV-2): Status: ACTIVE | Noted: 2024-10-21

## 2024-10-21 PROBLEM — M79.643 PAIN OF HAND: Status: ACTIVE | Noted: 2024-10-21

## 2024-10-21 PROBLEM — Z86.79 HISTORY OF PAROXYSMAL SUPRAVENTRICULAR TACHYCARDIA: Status: ACTIVE | Noted: 2024-10-21

## 2024-10-21 PROBLEM — M17.0 PRIMARY OSTEOARTHRITIS OF BOTH KNEES: Status: ACTIVE | Noted: 2024-10-21

## 2024-10-21 PROBLEM — M54.30 SCIATICA: Status: ACTIVE | Noted: 2018-12-22

## 2024-10-21 PROCEDURE — 3074F SYST BP LT 130 MM HG: CPT | Performed by: STUDENT IN AN ORGANIZED HEALTH CARE EDUCATION/TRAINING PROGRAM

## 2024-10-21 PROCEDURE — 99396 PREV VISIT EST AGE 40-64: CPT | Performed by: STUDENT IN AN ORGANIZED HEALTH CARE EDUCATION/TRAINING PROGRAM

## 2024-10-21 PROCEDURE — 3008F BODY MASS INDEX DOCD: CPT | Performed by: STUDENT IN AN ORGANIZED HEALTH CARE EDUCATION/TRAINING PROGRAM

## 2024-10-21 PROCEDURE — 99214 OFFICE O/P EST MOD 30 MIN: CPT | Performed by: STUDENT IN AN ORGANIZED HEALTH CARE EDUCATION/TRAINING PROGRAM

## 2024-10-21 PROCEDURE — 3078F DIAST BP <80 MM HG: CPT | Performed by: STUDENT IN AN ORGANIZED HEALTH CARE EDUCATION/TRAINING PROGRAM

## 2024-10-21 RX ORDER — ATORVASTATIN CALCIUM 10 MG/1
10 TABLET, FILM COATED ORAL DAILY
Qty: 90 TABLET | Refills: 1 | Status: SHIPPED | OUTPATIENT
Start: 2024-10-21

## 2024-10-21 RX ORDER — SERTRALINE HYDROCHLORIDE 50 MG/1
50 TABLET, FILM COATED ORAL DAILY
Qty: 90 TABLET | Refills: 1 | Status: SHIPPED | OUTPATIENT
Start: 2024-10-21

## 2024-10-21 RX ORDER — LANSOPRAZOLE 30 MG/1
30 CAPSULE, DELAYED RELEASE ORAL
Qty: 90 CAPSULE | Refills: 1 | Status: SHIPPED | OUTPATIENT
Start: 2024-10-21

## 2024-10-21 RX ORDER — MELOXICAM 15 MG/1
TABLET ORAL
Qty: 90 TABLET | Refills: 0 | Status: SHIPPED | OUTPATIENT
Start: 2024-10-21

## 2024-10-21 RX ORDER — DICLOFENAC SODIUM 10 MG/G
2 GEL TOPICAL 4 TIMES DAILY PRN
Qty: 200 G | Refills: 1 | Status: SHIPPED | OUTPATIENT
Start: 2024-10-21

## 2024-10-21 RX ORDER — DILTIAZEM HYDROCHLORIDE 180 MG/1
180 CAPSULE, EXTENDED RELEASE ORAL DAILY
Qty: 90 CAPSULE | Refills: 1 | Status: SHIPPED | OUTPATIENT
Start: 2024-10-21

## 2024-10-21 SDOH — ECONOMIC STABILITY: FOOD INSECURITY: WITHIN THE PAST 12 MONTHS, YOU WORRIED THAT YOUR FOOD WOULD RUN OUT BEFORE YOU GOT MONEY TO BUY MORE.: NEVER TRUE

## 2024-10-21 SDOH — ECONOMIC STABILITY: FOOD INSECURITY: WITHIN THE PAST 12 MONTHS, THE FOOD YOU BOUGHT JUST DIDN'T LAST AND YOU DIDN'T HAVE MONEY TO GET MORE.: NEVER TRUE

## 2024-10-21 ASSESSMENT — ANXIETY QUESTIONNAIRES
5. BEING SO RESTLESS THAT IT IS HARD TO SIT STILL: NOT AT ALL
4. TROUBLE RELAXING: NOT AT ALL
GAD7 TOTAL SCORE: 0
6. BECOMING EASILY ANNOYED OR IRRITABLE: NOT AT ALL
1. FEELING NERVOUS, ANXIOUS, OR ON EDGE: NOT AT ALL
IF YOU CHECKED OFF ANY PROBLEMS ON THIS QUESTIONNAIRE, HOW DIFFICULT HAVE THESE PROBLEMS MADE IT FOR YOU TO DO YOUR WORK, TAKE CARE OF THINGS AT HOME, OR GET ALONG WITH OTHER PEOPLE: NOT DIFFICULT AT ALL
2. NOT BEING ABLE TO STOP OR CONTROL WORRYING: NOT AT ALL
3. WORRYING TOO MUCH ABOUT DIFFERENT THINGS: NOT AT ALL
7. FEELING AFRAID AS IF SOMETHING AWFUL MIGHT HAPPEN: NOT AT ALL

## 2024-10-21 ASSESSMENT — ENCOUNTER SYMPTOMS
PALPITATIONS: 0
NAUSEA: 0
COLOR CHANGE: 0
LOSS OF SENSATION IN FEET: 0
OCCASIONAL FEELINGS OF UNSTEADINESS: 0
DIARRHEA: 0
VOMITING: 0
HEADACHES: 0
UNEXPECTED WEIGHT CHANGE: 0
COUGH: 0
SHORTNESS OF BREATH: 0
FEVER: 0
WHEEZING: 0
CHILLS: 0
ABDOMINAL PAIN: 0
FATIGUE: 0
DEPRESSION: 0
BACK PAIN: 1
DIZZINESS: 0
CONFUSION: 0
CONSTIPATION: 0

## 2024-10-21 ASSESSMENT — LIFESTYLE VARIABLES
HOW OFTEN DO YOU HAVE SIX OR MORE DRINKS ON ONE OCCASION: NEVER
SKIP TO QUESTIONS 9-10: 1
AUDIT-C TOTAL SCORE: 0
HOW MANY STANDARD DRINKS CONTAINING ALCOHOL DO YOU HAVE ON A TYPICAL DAY: PATIENT DOES NOT DRINK
HOW OFTEN DO YOU HAVE A DRINK CONTAINING ALCOHOL: NEVER

## 2024-10-21 ASSESSMENT — PAIN SCALES - GENERAL: PAINLEVEL_OUTOF10: 0-NO PAIN

## 2024-10-21 ASSESSMENT — PATIENT HEALTH QUESTIONNAIRE - PHQ9
2. FEELING DOWN, DEPRESSED OR HOPELESS: NOT AT ALL
SUM OF ALL RESPONSES TO PHQ9 QUESTIONS 1 & 2: 0
1. LITTLE INTEREST OR PLEASURE IN DOING THINGS: NOT AT ALL

## 2024-10-21 NOTE — PROGRESS NOTES
Subjective   Patient ID: Kamille Cotto Sr. is a 59 y.o. male who presents for Follow-up (6 month follow up ), medication (Was prescribed Zoloft 100mg at last visit from 50mg.  Pt tried to take 100mg for about 4 weeks and it made if feel disoriented.  Pt is now only taking 50mg ), and Back Pain (Lower left side pain (8 out of 10 at it is worse).  If standing his leg will numbness and tingling.  If changing position the numbness and tingling will go away but pain is still there. 2 months).  He is here for annual & FU. Reports he is having intermittent lower back pain as below; otherwise doing okay.     # HTN/HLD # SVT   - io BP was 11/76      - takes diltZ 180 mg daily, req refills   - takes atorva 10 mg daily w/o issues      # Anxiety/Depression   - reports stable mood, at LOV sertraline was inc to 100 mg but couldn't tolerate d/t brain fog & others; has cut down to 50 mg daily now, feeling much better now except ongoing fatigue. His bld work from 04/24 as B12, TSH & other bld work were wnl.   - denies SI/HI and other concerns      # chronic joint pain   - reports his chronic pain on his hands and some times the knee but its sl better now as its getting warmer   - takes mobic 15 mg daily prn w/ help. Uses volteran gel as needed   - also reports L side of lower back pain and occa radiating to his legs. Declined PT.     # HM   Self health assessment:  okay   Concern: as above   Occupation:    Living With: wife & pets     Sleep: okay   Exercise: not much   Diet: mixed   Tobacco: No  Alcohol: Alcohol Use: Yes, patient drinks alcohol. Frequency: 2-3 beers weekly.  Sexually active: Yes     Dentist: has dentures   Eye doctor: yes   Hearing issues: No    Family history of colon cancer: no  Last colonoscopy & result: 05/2022; WNL; next due 05/2025.   Family history of prostate cancer: none; his last PSA 05/23, wnl.   History of abnormal lipids: yes - on statin    Review of Systems   Constitutional:  Negative for  chills, fatigue, fever and unexpected weight change.   HENT: Negative.     Respiratory:  Negative for cough, shortness of breath and wheezing.    Cardiovascular:  Negative for chest pain, palpitations and leg swelling.   Gastrointestinal:  Negative for abdominal pain, constipation, diarrhea, nausea and vomiting.   Musculoskeletal:  Positive for back pain.   Skin:  Negative for color change and rash.   Neurological:  Negative for dizziness and headaches.   Psychiatric/Behavioral:  Negative for behavioral problems and confusion.         Objective    /76 (BP Location: Left arm, Patient Position: Sitting, BP Cuff Size: Large adult)   Pulse 83   Temp 36.2 °C (97.1 °F)   Resp 20   Ht 1.829 m (6')   Wt 105 kg (231 lb 6.4 oz)   SpO2 95%   BMI 31.38 kg/m²  Body mass index is 31.38 kg/m².    Physical Exam  Vitals and nursing note reviewed.   Constitutional:       Appearance: Normal appearance. He is obese.   HENT:      Right Ear: Tympanic membrane normal.      Left Ear: Tympanic membrane normal.   Eyes:      Extraocular Movements: Extraocular movements intact.      Pupils: Pupils are equal, round, and reactive to light.   Cardiovascular:      Rate and Rhythm: Normal rate and regular rhythm.      Pulses: Normal pulses.      Heart sounds: Normal heart sounds.   Pulmonary:      Effort: Pulmonary effort is normal.      Breath sounds: Normal breath sounds.   Abdominal:      General: Abdomen is flat. Bowel sounds are normal.      Palpations: Abdomen is soft.   Musculoskeletal:         General: No signs of injury. Normal range of motion.      Left lower leg: No edema.      Comments: Back: Not ttp. SLR neg bl.    Neurological:      General: No focal deficit present.      Mental Status: He is alert.   Psychiatric:         Mood and Affect: Mood normal.         Behavior: Behavior normal.        Assessment and Plan   He is here for annual physical and FU. He is having mild flare ups of his chronic lower back pain, likely  having sciatica pain 2/2 OA vs other. Obtain Xray as below to eval further. Declined PT and other meds as steroid burst, muscle relaxer for now. Cont mobic as needed;  Overall doing okay, no major concerns today and is clinically & vitally stable. Plan as follows        # HTN/HLD # SVT   - BP at goal       - cont diltZ 180 mg daily  - cont atorva 10 mg daily   - encourage heart healthy & DASH diet; continue exercise as tolerated, at least 150 mins per wk   - BW per emr, will call for any abn results as indicated; pt made aware      # Anxiety/Depression   - mood stable at the moment   - cont sertraline 50 mg as usual for now.   - Continue following home relaxation & mindfulness activities daily as deep breathing exercises, meditations, reading books, playing music, etc.      # chronic joint pain   - acute on chronic back/joint pain   - cont mobic as needed   - others as above     #HM   Screening tests:  - Colonoscopy (age 45-75): UTD  - PSA (age >50): ordered   - Lipid profile: UTD     Primary prevention:  - Vaccines: declined all   - Statin (age 40-65 or high risk): taking     Counseling:   - ETOH (age>18): D/ safe drinking habits and advice to cut down on liquor/beers as applies   - Diet, Weight gain: Advise heart healthy diet (low carbs, low fat; add more fruits/veges and whole grain food) and regular exercise 30mins daily x 5 days per week.  Also rec 10mins of aerobic exercise/jogging daily x 5 days/wk  - Rec Ca (600-1200mg) and Vit D (800-1000U) daily     Others:  - Depression screening: Neg, happy appearing male  - Bld work per EMR, will call for any abn result, pt was made aware   - cont taking all other meds as rx'd       Assessment/Plan   Problem List Items Addressed This Visit             ICD-10-CM    Esophageal reflux K21.9    Relevant Medications    lansoprazole (Prevacid) 30 mg DR capsule    Prediabetes R73.03    Relevant Orders    Hemoglobin A1c     Other Visit Diagnoses         Codes    Routine general  medical examination at a health care facility    -  Primary Z00.00    Mixed hyperlipidemia     E78.2    Relevant Medications    atorvastatin (Lipitor) 10 mg tablet    Other Relevant Orders    Lipid Panel    Primary hypertension     I10    Relevant Medications    dilTIAZem XR (DILT-XR) 180 mg 24 hr capsule    Other Relevant Orders    Comprehensive Metabolic Panel    CBC and Auto Differential    Chronic back pain, unspecified back location, unspecified back pain laterality     M54.9, G89.29    Relevant Medications    meloxicam (Mobic) 15 mg tablet    diclofenac sodium (Voltaren) 1 % gel    Anxiety     F41.9    Relevant Medications    sertraline (Zoloft) 50 mg tablet    Chronic left hip pain     M25.552, G89.29    Relevant Orders    XR hip left with pelvis when performed 2 or 3 views    Prostate cancer screening     Z12.5    Relevant Orders    Prostate Spec.Ag,Screen          Rtc 6 mo for FU    MD DAPHNE [facility], Family Medicine

## 2024-10-22 ENCOUNTER — LAB (OUTPATIENT)
Dept: LAB | Facility: LAB | Age: 59
End: 2024-10-22
Payer: COMMERCIAL

## 2024-10-22 ENCOUNTER — HOSPITAL ENCOUNTER (OUTPATIENT)
Dept: RADIOLOGY | Facility: CLINIC | Age: 59
Discharge: HOME | End: 2024-10-22
Payer: COMMERCIAL

## 2024-10-22 DIAGNOSIS — E78.2 MIXED HYPERLIPIDEMIA: ICD-10-CM

## 2024-10-22 DIAGNOSIS — Z12.5 PROSTATE CANCER SCREENING: ICD-10-CM

## 2024-10-22 DIAGNOSIS — G89.29 CHRONIC LEFT HIP PAIN: ICD-10-CM

## 2024-10-22 DIAGNOSIS — M25.552 CHRONIC LEFT HIP PAIN: ICD-10-CM

## 2024-10-22 DIAGNOSIS — R73.03 PREDIABETES: ICD-10-CM

## 2024-10-22 DIAGNOSIS — I10 PRIMARY HYPERTENSION: ICD-10-CM

## 2024-10-22 LAB
ALBUMIN SERPL BCP-MCNC: 4.2 G/DL (ref 3.4–5)
ALP SERPL-CCNC: 52 U/L (ref 33–136)
ALT SERPL W P-5'-P-CCNC: 21 U/L (ref 10–52)
ANION GAP SERPL CALC-SCNC: 11 MMOL/L (ref 10–20)
AST SERPL W P-5'-P-CCNC: 16 U/L (ref 9–39)
BASOPHILS # BLD AUTO: 0.03 X10*3/UL (ref 0–0.1)
BASOPHILS NFR BLD AUTO: 0.5 %
BILIRUB SERPL-MCNC: 0.4 MG/DL (ref 0–1.2)
BUN SERPL-MCNC: 13 MG/DL (ref 6–23)
CALCIUM SERPL-MCNC: 9.1 MG/DL (ref 8.6–10.3)
CHLORIDE SERPL-SCNC: 103 MMOL/L (ref 98–107)
CHOLEST SERPL-MCNC: 160 MG/DL (ref 0–199)
CHOLESTEROL/HDL RATIO: 3.7
CO2 SERPL-SCNC: 29 MMOL/L (ref 21–32)
CREAT SERPL-MCNC: 0.94 MG/DL (ref 0.5–1.3)
EGFRCR SERPLBLD CKD-EPI 2021: >90 ML/MIN/1.73M*2
EOSINOPHIL # BLD AUTO: 0.07 X10*3/UL (ref 0–0.7)
EOSINOPHIL NFR BLD AUTO: 1.1 %
ERYTHROCYTE [DISTWIDTH] IN BLOOD BY AUTOMATED COUNT: 12.4 % (ref 11.5–14.5)
GLUCOSE SERPL-MCNC: 110 MG/DL (ref 74–99)
HCT VFR BLD AUTO: 47.7 % (ref 41–52)
HDLC SERPL-MCNC: 43.6 MG/DL
HGB BLD-MCNC: 15.9 G/DL (ref 13.5–17.5)
IMM GRANULOCYTES # BLD AUTO: 0.03 X10*3/UL (ref 0–0.7)
IMM GRANULOCYTES NFR BLD AUTO: 0.5 % (ref 0–0.9)
LDLC SERPL CALC-MCNC: 69 MG/DL
LYMPHOCYTES # BLD AUTO: 2.02 X10*3/UL (ref 1.2–4.8)
LYMPHOCYTES NFR BLD AUTO: 31.4 %
MCH RBC QN AUTO: 28.8 PG (ref 26–34)
MCHC RBC AUTO-ENTMCNC: 33.3 G/DL (ref 32–36)
MCV RBC AUTO: 86 FL (ref 80–100)
MONOCYTES # BLD AUTO: 0.57 X10*3/UL (ref 0.1–1)
MONOCYTES NFR BLD AUTO: 8.9 %
NEUTROPHILS # BLD AUTO: 3.72 X10*3/UL (ref 1.2–7.7)
NEUTROPHILS NFR BLD AUTO: 57.6 %
NON HDL CHOLESTEROL: 116 MG/DL (ref 0–149)
NRBC BLD-RTO: 0 /100 WBCS (ref 0–0)
PLATELET # BLD AUTO: 277 X10*3/UL (ref 150–450)
POTASSIUM SERPL-SCNC: 4.3 MMOL/L (ref 3.5–5.3)
PROT SERPL-MCNC: 6.8 G/DL (ref 6.4–8.2)
PSA SERPL-MCNC: 0.29 NG/ML
RBC # BLD AUTO: 5.53 X10*6/UL (ref 4.5–5.9)
SODIUM SERPL-SCNC: 139 MMOL/L (ref 136–145)
TRIGL SERPL-MCNC: 235 MG/DL (ref 0–149)
VLDL: 47 MG/DL (ref 0–40)
WBC # BLD AUTO: 6.4 X10*3/UL (ref 4.4–11.3)

## 2024-10-22 PROCEDURE — 80061 LIPID PANEL: CPT

## 2024-10-22 PROCEDURE — 73502 X-RAY EXAM HIP UNI 2-3 VIEWS: CPT | Mod: LT

## 2024-10-22 PROCEDURE — 83036 HEMOGLOBIN GLYCOSYLATED A1C: CPT

## 2024-10-22 PROCEDURE — 84153 ASSAY OF PSA TOTAL: CPT

## 2024-10-22 PROCEDURE — 36415 COLL VENOUS BLD VENIPUNCTURE: CPT

## 2024-10-22 PROCEDURE — 80053 COMPREHEN METABOLIC PANEL: CPT

## 2024-10-22 PROCEDURE — 73502 X-RAY EXAM HIP UNI 2-3 VIEWS: CPT | Mod: LEFT SIDE | Performed by: RADIOLOGY

## 2024-10-22 PROCEDURE — 85025 COMPLETE CBC W/AUTO DIFF WBC: CPT

## 2024-10-23 LAB
EST. AVERAGE GLUCOSE BLD GHB EST-MCNC: 126 MG/DL
HBA1C MFR BLD: 6 %

## 2024-10-31 ENCOUNTER — PATIENT MESSAGE (OUTPATIENT)
Dept: PRIMARY CARE | Facility: CLINIC | Age: 59
End: 2024-10-31
Payer: COMMERCIAL

## 2024-11-04 ENCOUNTER — TELEPHONE (OUTPATIENT)
Dept: PRIMARY CARE | Facility: CLINIC | Age: 59
End: 2024-11-04
Payer: COMMERCIAL

## 2024-11-04 DIAGNOSIS — M54.9 CHRONIC BACK PAIN, UNSPECIFIED BACK LOCATION, UNSPECIFIED BACK PAIN LATERALITY: Primary | ICD-10-CM

## 2024-11-04 DIAGNOSIS — G89.29 CHRONIC BACK PAIN, UNSPECIFIED BACK LOCATION, UNSPECIFIED BACK PAIN LATERALITY: Primary | ICD-10-CM

## 2024-12-02 ENCOUNTER — TRANSCRIBE ORDERS (OUTPATIENT)
Dept: ORTHOPEDIC SURGERY | Facility: HOSPITAL | Age: 59
End: 2024-12-02
Payer: COMMERCIAL

## 2024-12-02 DIAGNOSIS — M54.50 LOW BACK PAIN, UNSPECIFIED BACK PAIN LATERALITY, UNSPECIFIED CHRONICITY, UNSPECIFIED WHETHER SCIATICA PRESENT: ICD-10-CM

## 2024-12-03 ENCOUNTER — HOSPITAL ENCOUNTER (OUTPATIENT)
Dept: RADIOLOGY | Facility: CLINIC | Age: 59
Discharge: HOME | End: 2024-12-03
Payer: COMMERCIAL

## 2024-12-03 ENCOUNTER — OFFICE VISIT (OUTPATIENT)
Dept: ORTHOPEDIC SURGERY | Facility: CLINIC | Age: 59
End: 2024-12-03
Payer: COMMERCIAL

## 2024-12-03 VITALS — HEIGHT: 72 IN | BODY MASS INDEX: 30.48 KG/M2 | WEIGHT: 225 LBS

## 2024-12-03 DIAGNOSIS — M43.16 SPONDYLOLISTHESIS OF LUMBAR REGION: ICD-10-CM

## 2024-12-03 DIAGNOSIS — G89.29 CHRONIC BACK PAIN, UNSPECIFIED BACK LOCATION, UNSPECIFIED BACK PAIN LATERALITY: ICD-10-CM

## 2024-12-03 DIAGNOSIS — M54.9 CHRONIC BACK PAIN, UNSPECIFIED BACK LOCATION, UNSPECIFIED BACK PAIN LATERALITY: ICD-10-CM

## 2024-12-03 DIAGNOSIS — M54.50 LOW BACK PAIN, UNSPECIFIED BACK PAIN LATERALITY, UNSPECIFIED CHRONICITY, UNSPECIFIED WHETHER SCIATICA PRESENT: ICD-10-CM

## 2024-12-03 DIAGNOSIS — M54.16 LUMBAR RADICULOPATHY: ICD-10-CM

## 2024-12-03 DIAGNOSIS — M47.816 LUMBAR SPONDYLOSIS: Primary | ICD-10-CM

## 2024-12-03 PROCEDURE — 99214 OFFICE O/P EST MOD 30 MIN: CPT

## 2024-12-03 PROCEDURE — 72110 X-RAY EXAM L-2 SPINE 4/>VWS: CPT

## 2024-12-03 PROCEDURE — 99204 OFFICE O/P NEW MOD 45 MIN: CPT

## 2024-12-03 PROCEDURE — 3008F BODY MASS INDEX DOCD: CPT

## 2024-12-03 PROCEDURE — 72110 X-RAY EXAM L-2 SPINE 4/>VWS: CPT | Performed by: RADIOLOGY

## 2024-12-03 RX ORDER — GABAPENTIN 300 MG/1
300 CAPSULE ORAL NIGHTLY
Qty: 30 CAPSULE | Refills: 2 | Status: SHIPPED | OUTPATIENT
Start: 2024-12-03 | End: 2025-03-03

## 2024-12-03 RX ORDER — PREDNISONE 20 MG/1
TABLET ORAL
Qty: 30 TABLET | Refills: 0 | Status: SHIPPED | OUTPATIENT
Start: 2024-12-03 | End: 2024-12-18

## 2024-12-03 ASSESSMENT — PAIN - FUNCTIONAL ASSESSMENT: PAIN_FUNCTIONAL_ASSESSMENT: 0-10

## 2024-12-03 NOTE — PROGRESS NOTES
HPI:  Kamille Cotto Sr. is a 59-year-old male who presents today with a few year history of back and radicular pain.  However, over the past 2 months, his pain has intensified and become more constant.  His pain is mainly in the left leg and goes down the posterior aspect of the leg into the top of the foot.  He does have numbness and tingling in the calf and top of foot.  He also has low back spasms.  He denies symptoms in the right leg.  He does have muscle spasms in the back.  His symptoms are worse with prolonged standing.  He is ambulating well.  He is on Mobic and uses topicals.  He works as a .  No other questions or concerns tomorrow.    ROS:  Reviewed on EMR and patient intake sheet.    PMH/SH:  Reviewed on EMR and patient intake sheet.    Exam:  MSK: Full strength range of motion of lower extremities bilaterally.  Negative straight legs bilaterally.  General: No acute distress. Awake and conversant.  Eyes: Normal conjunctiva, anicteric. Round symmetric pupils.  ENT: Hearing grossly intact. No nasal discharge.  Neck: Neck is supple. No masses or thyromegaly.  Respiratory: Respirations are non-labored. No wheezing.  Skin: Warm. No rashes or ulcers.  Psych: Alert and oriented. Cooperative, appropriate mood and affect, normal judgement.  CV: No lower extremity edema.  Neuro: Sensation and CN II-XII grossly normal.    Radiology:     Lumbar spine x-rays personally reviewed and demonstrate grade 2 anterolisthesis of L5 on S1.  Remainder of lumbar spine within normal limits.  Mild spondylosis.    Diagnosis:    Lumbar radiculopathy  Lumbar spondylolisthesis  Lumbar spondylosis    Assessment and Plan:  Patient was seen today and evaluated for left-sided radicular symptoms that been worsening over the past 2 months most likely due to his spondylolisthesis.  At this time, I recommended physical therapy, ambulating as tolerated, a prednisone taper, and gabapentin for nighttime use.  I recommend this only  for nighttime as he works as a .  I also placed a referral for pain management for epidural injections.  He should follow-up with me if his symptoms continue to worsen despite conservative management.  At that point, an MRI would be warranted.  Patient feels all questions were answered today.  Patient agrees to the plan above.    **This note was dictated using speech recognition software and was not corrected for spelling or grammatical errors**    Nick Mitchell PA-C  Department of Orthopaedic Surgery  10:24 AM  12/03/24    [address] [place] [address]    Voicemail: [phone]   Appts: [phone]  Fax: [phone]

## 2024-12-24 ENCOUNTER — EVALUATION (OUTPATIENT)
Dept: PHYSICAL THERAPY | Facility: CLINIC | Age: 59
End: 2024-12-24
Payer: COMMERCIAL

## 2024-12-24 DIAGNOSIS — M54.16 LUMBAR RADICULOPATHY: Primary | ICD-10-CM

## 2024-12-24 PROCEDURE — 97110 THERAPEUTIC EXERCISES: CPT | Mod: GP | Performed by: PHYSICAL THERAPIST

## 2024-12-24 PROCEDURE — 97535 SELF CARE MNGMENT TRAINING: CPT | Mod: GP | Performed by: PHYSICAL THERAPIST

## 2024-12-24 PROCEDURE — 97161 PT EVAL LOW COMPLEX 20 MIN: CPT | Mod: GP | Performed by: PHYSICAL THERAPIST

## 2024-12-24 ASSESSMENT — PATIENT HEALTH QUESTIONNAIRE - PHQ9
1. LITTLE INTEREST OR PLEASURE IN DOING THINGS: NOT AT ALL
SUM OF ALL RESPONSES TO PHQ9 QUESTIONS 1 AND 2: 0
2. FEELING DOWN, DEPRESSED OR HOPELESS: NOT AT ALL

## 2024-12-24 ASSESSMENT — ENCOUNTER SYMPTOMS
OCCASIONAL FEELINGS OF UNSTEADINESS: 1
DEPRESSION: 0
LOSS OF SENSATION IN FEET: 1

## 2024-12-24 NOTE — PROGRESS NOTES
Physical Therapy Evaluation    Patient Name: Kamille Cotto Sr.  MRN: [id number]  Today's Date: 12/24/2024  Visit: 1/60  DOS/DOI:    6/1/24  Referred by:   Nick Mitchell  Time Calculation  Start Time: 1135  Stop Time: 1240  Time Calculation (min): 65 min  PT Evaluation Time Entry  PT Evaluation (Low) Time Entry: 35  PT Therapeutic Procedures Time Entry  Therapeutic Exercise Time Entry: 15  Self-Care/Home Mgmt Training: 15                 Diagnosis:   1. Lumbar radiculopathy  Referral to Physical Therapy    Follow Up In Physical Therapy                PMH  - OA, spondylolisthesis    HPI  He reports a 2 year hx of back pain with rad into L LE.  No injury or incident.  Gradual onset.  He tried home remedies but did not have improvement.  He did move his wallet from his pocket which helped.  6 months ago it worsened without known cause.  He had a wellness check with his MD and mentioned the back pain.  He was sent to [facility].  He had a X-ray that was positive for grade 2 L5/S1 anterolisthesis due to L5 pars defect and mod-severe deg changes.  He was given a steroid pack which helped.  He was also given gabapentin which has been helping.  He was referred to PT.    Precautions  Avoid extension    SUBJECTIVE  Symptoms:  - L LBP at the PSIS that rad into the L LE to the glut.  At times it will go down the post LE to the foot.  He has N&T in the same area intermittently.  It is rated 1-8/10 depending on position/activity.  The pain increases in WB positions such as prolonged standing (5-10 min) and walking.  Recently sitting has been irritating too.  Wearing tennis shoes increased pain.   The pain decreased with lumbar flex and sitting.  Wearing boots decreased pain.  Functionally he is limited in work duties, walking, standing, stairs and dressing/showering.    Patient's Living Environment:  - works as a   - lives with wife  - 2 story home.  Bedroom on 2nd floor    Previous Level of Function:  - prior to 6  months ago he was better able to stand/walk (30-45 min)    Patient's Therapy Goals:  - relief from pain with ADLS    OBJECTIVE  Observation:    - ankle pro B, stands in lumbar flex,    Palpation:   - increased tension B paraspinals.    AROM:  - Lumbar flex = WNL, repeated flex = WNL, ext = 0 with hinge at L3, SB = WFL L with pain, 50% R with pain, rot = WFL B    Myotomes:  - L2-5 = 5/5 B    MMT:  - TA = strong  - hip abd = 5/5 B    Neuro:  - Reflex L3 = 2/4 B, S1 = 0/4 B  - Negative clonus and babinski B    Special Tests:  - Slump, B SLR, compression over load, B prone knee flex, B torsion, PA L1-5 and NWB kinetic    Joint Mobility:   - some pain with ext and R SB but motion present    Outcome Measure:  - Oswestry = 46%    ASSESSMENT  The patient is a 60 y/o male presenting to PT with chronic LBP that worsened 6 months ago.  He has a L5 pars defect with grade 2 L5/S1 anterolisthesis resulting.  As a result he has difficulty tolerating loaded positions such as standing and walking.  Flexion relieves his pain as it decompresses the nerves and facet joints.  He will benefit from PT to improve lumbar stability during WB/loaded positions to increase the time he can be in standing/walking before needed to sit.  As the L5/S1 segment cannot be fully stabilized without surgery I do not anticipate full resolution of symptoms but improvement of spinal stability can be achieved.    Is skilled care required:  yes  Rehab potential: good  Clinical presentation:  Stable and/or uncomplicated characteristics  Complexity:   . Low complexity due to patient's clinical presentation being stable and uncomplicated by any significant comorbidities that may affect rehab tolerance and progression.       Personal factors effecting care:  his work schedule    PLAN  Frequency/duration:  2x/wk x 8 wks  Planned Interventions:  MT, therapeutic exercise, neuro re-ed, modalities prn  Patient/caregiver agrees with POC:  yes    TODAY'S TREATMENT  -  evaluation of the lumbar spine completed.  He was educated on his dx and the lumbar spine.  He was shown lumbar support belts on [employer] that he can purchase to use when doing heavy lifting at work.  He was educated on body mechanics and lifting.  He was instructed to avoid rotational forces and extension.  - he was given a HEP as seen below:    Access Code: [email or web address]  URL: [email or web address]  Date: 12/24/2024  Prepared by: Adia Fink    Exercises  - Supine Transversus Abdominis Bracing - Hands on Stomach  - 1 x daily - 7 x weekly - 2-3 sets - 10 reps  - Sidelying Hip Abduction  - 1 x daily - 7 x weekly - 2-3 sets - 10 reps  - Clamshell  - 1 x daily - 7 x weekly - 2-3 sets - 10 reps  - Supine Multifidus with Heel Press  - 1 x daily - 7 x weekly - 2-3 sets - 10 reps  - Curl Up with Reach  - 1 x daily - 7 x weekly - 2-3 sets - 10 reps    Goals:   Active       PT Problem       he will be independent with his HEP       Start:  12/24/24    Expected End:  12/31/24            achieve full AROM of the lumbar spine without pain       Start:  12/24/24    Expected End:  01/14/25            report no episodes of the back locking up or preventing him from moving.       Start:  12/24/24    Expected End:  01/28/25            perform lifting ADLS at home and work without increase in pain.       Start:  12/24/24    Expected End:  02/11/25            demonstrate upright posture without increased pain       Start:  12/24/24    Expected End:  01/14/25

## 2024-12-26 DIAGNOSIS — M54.16 LUMBAR RADICULOPATHY: ICD-10-CM

## 2024-12-26 RX ORDER — GABAPENTIN 300 MG/1
300 CAPSULE ORAL NIGHTLY
Qty: 30 CAPSULE | Refills: 2 | Status: SHIPPED | OUTPATIENT
Start: 2024-12-26 | End: 2025-03-26

## 2025-01-09 ENCOUNTER — TREATMENT (OUTPATIENT)
Dept: PHYSICAL THERAPY | Facility: CLINIC | Age: 60
End: 2025-01-09
Payer: COMMERCIAL

## 2025-01-09 DIAGNOSIS — M54.16 LUMBAR RADICULOPATHY: ICD-10-CM

## 2025-01-09 PROCEDURE — 97110 THERAPEUTIC EXERCISES: CPT | Mod: GP | Performed by: PHYSICAL THERAPIST

## 2025-01-09 NOTE — PROGRESS NOTES
Physical Therapy Treatment    Patient Name: Kamille Cotto Sr.  MRN: [id number]  Today's Date: 1/9/2025  Visit 2/60  DOS/DOI:    6/1/24  Referred by:   Nick Mitchell  Time Calculation  Start Time: 1751  Stop Time: 1834  Time Calculation (min): 43 min     PT Therapeutic Procedures Time Entry  Therapeutic Exercise Time Entry: 43                 Diagnosis:   1. Lumbar radiculopathy  Follow Up In Physical Therapy            PRECAUTIONS:  Avoid extension    SUBJECTIVE:  Today he reports 3/10 LBP.  It will increase with heavy work or standing.  If he moves into flex the pain decreases.  HEP compliance: no    OBJECTIVE:  - Lumbar flex = WNL, ext = WFL, SB = WFL with pain during L SB, rot = WFL B    Treatment:  - initiated exercise for core stability.  Emphasized TA, glut and multifidus.  Therapeutic Exercise  Therapeutic Exercise Activity 1: TA with hip add x10  Therapeutic Exercise Activity 2: knee tap 2x12  Therapeutic Exercise Activity 3: clamshell GN TB, 2x 12  Therapeutic Exercise Activity 4: supine isometric hip ext for multifidus, B x12  Therapeutic Exercise Activity 5: bridge 2x12  Therapeutic Exercise Activity 6: hip abd B 2x12  Therapeutic Exercise Activity 7: hip ext in lumbar flex, 5 sec hold x10 B                ASSESSMENT:  Moe rx well.  He reported 0/10 pain after rx.  He had no pain during exercise but did report muscle use.  He will benefit from continued therapy to improv core stability.     PLAN:  Increase reps and exercise as moe.

## 2025-01-13 ENCOUNTER — TREATMENT (OUTPATIENT)
Dept: PHYSICAL THERAPY | Facility: CLINIC | Age: 60
End: 2025-01-13
Payer: COMMERCIAL

## 2025-01-13 ENCOUNTER — OFFICE VISIT (OUTPATIENT)
Dept: PAIN MEDICINE | Facility: HOSPITAL | Age: 60
End: 2025-01-13
Payer: COMMERCIAL

## 2025-01-13 VITALS
WEIGHT: 236.3 LBS | DIASTOLIC BLOOD PRESSURE: 92 MMHG | OXYGEN SATURATION: 97 % | BODY MASS INDEX: 32 KG/M2 | SYSTOLIC BLOOD PRESSURE: 150 MMHG | RESPIRATION RATE: 18 BRPM | HEIGHT: 72 IN | HEART RATE: 70 BPM

## 2025-01-13 DIAGNOSIS — M54.16 LUMBAR RADICULOPATHY: Primary | ICD-10-CM

## 2025-01-13 DIAGNOSIS — M54.16 LUMBAR RADICULOPATHY: ICD-10-CM

## 2025-01-13 DIAGNOSIS — M43.17 SPONDYLOLISTHESIS AT L5-S1 LEVEL: ICD-10-CM

## 2025-01-13 PROCEDURE — 3077F SYST BP >= 140 MM HG: CPT | Performed by: PHYSICAL MEDICINE & REHABILITATION

## 2025-01-13 PROCEDURE — 3008F BODY MASS INDEX DOCD: CPT | Performed by: PHYSICAL MEDICINE & REHABILITATION

## 2025-01-13 PROCEDURE — 1036F TOBACCO NON-USER: CPT | Performed by: PHYSICAL MEDICINE & REHABILITATION

## 2025-01-13 PROCEDURE — 99214 OFFICE O/P EST MOD 30 MIN: CPT | Performed by: PHYSICAL MEDICINE & REHABILITATION

## 2025-01-13 PROCEDURE — 99204 OFFICE O/P NEW MOD 45 MIN: CPT | Performed by: PHYSICAL MEDICINE & REHABILITATION

## 2025-01-13 PROCEDURE — 3080F DIAST BP >= 90 MM HG: CPT | Performed by: PHYSICAL MEDICINE & REHABILITATION

## 2025-01-13 PROCEDURE — 97110 THERAPEUTIC EXERCISES: CPT | Mod: GP | Performed by: PHYSICAL THERAPIST

## 2025-01-13 RX ORDER — DIAZEPAM 5 MG/1
5 TABLET ORAL ONCE AS NEEDED
OUTPATIENT
Start: 2025-01-13

## 2025-01-13 ASSESSMENT — ENCOUNTER SYMPTOMS
DEPRESSION: 0
LOSS OF SENSATION IN FEET: 0
OCCASIONAL FEELINGS OF UNSTEADINESS: 0

## 2025-01-13 ASSESSMENT — PATIENT HEALTH QUESTIONNAIRE - PHQ9
2. FEELING DOWN, DEPRESSED OR HOPELESS: NOT AT ALL
1. LITTLE INTEREST OR PLEASURE IN DOING THINGS: NOT AT ALL
SUM OF ALL RESPONSES TO PHQ9 QUESTIONS 1 AND 2: 0

## 2025-01-13 ASSESSMENT — PAIN SCALES - GENERAL: PAINLEVEL_OUTOF10: 3

## 2025-01-13 NOTE — H&P (VIEW-ONLY)
New pt c/o; low back pain    Location of Pain: low back pain, mainly on left side, at times on right side. Pain radiates down bilat legs, at times all the way to his foot-depends on if standing too long. Has had pain past few mths on a consistent basis. Pain actually began 2 yrs ago. Denies injuries. Sitting long periods in recliner with legs stretched out. increases pain         Pain Score: 3/10    Other pain medication/neuromodulator: meloxicam every day, tylenol-stopped taking until began gabapentin early Dec. 2024-states is helping. 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 Heat-helps some  OA 1/13/25  Oswestry score 35      Subjective   Patient ID: Kamille Cotto Sr. is a 59 y.o. male who presents for Back Pain (Low back pain).  HPI    59-year-old male referred by orthopedic spine surgery for consideration of epidural steroid injection.  Patient has had a long history of lower back pain with worsening symptoms over the last couple months with radiation down his left leg especially when he stands for longer periods of time.  Patient goes down to his top and bottom of his foot.  He has been doing physical therapy with some improvement as well as he has been taking gabapentin 300 mg at night with improvement but he is unable to take it during the day due to the fact that he is a .  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  Denies any lower extremity weakness or bowel or bladder changes.                Monitoring and compliance:    ORT:    PDUQ:    Office Agreement: 1/13/25  Oswestry score 25      Review of Systems     Current Outpatient Medications   Medication Instructions    ascorbic acid (VITAMIN C) 500 mg, Daily    atorvastatin (LIPITOR) 10 mg, oral, Daily    diclofenac sodium (VOLTAREN) 2 g, Topical, 4 times daily PRN    dilTIAZem XR (DILT-XR) 180 mg, oral,  Daily    gabapentin (NEURONTIN) 300 mg, oral, Nightly    glucosamine-chondroitin 500-400 mg tablet 1 tablet, Every other day    lansoprazole (PREVACID) 30 mg, oral, Daily before breakfast    meloxicam (Mobic) 15 mg tablet TAKE 1 TABLET BY MOUTH ONCE  DAILY AS NEEDED FOR MODERATE  PAIN (SCALE 4-6)    multivitamin tablet Take by mouth.    pyridoxine (B-6) 50 mg, Daily    sertraline (ZOLOFT) 50 mg, oral, Daily    turmeric root extract 500 mg tablet Take by mouth.        Past Medical History:   Diagnosis Date    Anxiety disorder, unspecified     Anxiety    Other specified abnormal findings of blood chemistry 07/20/2020    Low testosterone    Personal history of other diseases of the circulatory system     History of paroxysmal supraventricular tachycardia    Personal history of other specified conditions     History of palpitations        Past Surgical History:   Procedure Laterality Date    OTHER SURGICAL HISTORY  09/23/2021    Cholecystectomy        Family History   Problem Relation Name Age of Onset    Breast cancer Mother      Heart attack Father          Allergies   Allergen Reactions    Bacitracin Swelling and Unknown    Morphine Unknown    Penicillins Diarrhea and Unknown        No results found for this or any previous visit from the past 1000 days.      Objective     Vitals:    01/13/25 1240   Pulse: 70   Resp: 18   SpO2: 97%      Pain Score:   3        Physical Exam    GENERAL EXAM  Vital Signs: Vital signs to include heart rate, respiration rate, blood pressure, and temperature were reviewed.  General Appearance:  Awake, alert, healthy appearing, well developed, No acute distress.  Head: Normocephalic without evidence of head injury.  Neck: The appearance of the neck was normal without swelling with a midline trachea.  Eyes: The eyelids and eyebrows exhibited no abnormalities.  Pupils were not pin-point.  Sclera was without icterus.  Lungs: Respiration rhythm and depth was normal.  Respiratory movements were  normal without labored breathing.  Cardiovascular: No peripheral edema was present.    Neurological: Patient was oriented to time, place, and person.  Speech was normal.  Balance, gait, and stance were unremarkable.    Psychiatric: Appearance was normal with appropriate dress.  Mood was euthymic and affect was normal.  Skin: Affected regions were without ecchymosis or skin lesions.    Back (MSK/neuro/skin):  Full active and passive range of motion in all planes  Strength 5 out of 5 bilateral lower extremities  Sensation to light-touch grossly intact bilateral lower extremities  Deep tendon reflexes equal bilateral lower extremities  No edema/effusion/erythema  Skin: no skin lesions or scars  B SLR (-)  B sacral spring test (-),   B facet load (-)  B SIJ tenderness (-)   B JOAN (-)  Full flexion/extension  No TTP, no muscle spasm over lumbar paraspinals    Physical exam as above except:  Some tenderness to palpation over left lumbar paraspinals, equivocal straight leg raise on the left with some radiation          Assessment/Plan   Diagnoses and all orders for this visit:  Spondylolisthesis at L5-S1 level  Lumbar radiculopathy  -     Referral to Pain Management    59-year-old male with lower back pain with left lower extremity radicular symptoms in roughly an L5/S1 distribution.  I did personally review patient's x-ray of his lumbar spine showing degenerative changes at L3-4 L4-5 and L5-S1 and L5-S1 he also has some fairly significant grade 2 anterolisthesis of L5 on S1.  This is likely causing lower extremity neuritis/possible claudication symptoms.  He has failed conservative treatment to include NSAIDs as well as physical therapy.  It would be reasonable to move forward with initial epidural steroid injection.  Plan for today:  - We will schedule patient for initial L4-5 interlaminar epidural steroid injection.  Given the amount of listhesis at L5-S1 I do not want to go to that level and I will go to the level  above and left the medication flow down to L5-S1.  We discussed the risks, benefits and alternatives to the procedure and the patient would like to proceed   - Continue with home exercise program and physical therapy.  - Continue with gabapentin 300 nightly.  - Continue with meloxicam and Tylenol as needed.  - Patient will follow-up with my nurse practitioner after the initial injection and if he does not have durable follow-up we will obtain an MRI of his lumbar spine at that point.    Patient's blood pressure slightly elevated in clinic today, patient asymptomatic he was instructed to discuss with his primary care doctor for recheck.       This note was generated with the aid of dictation software, there may be typos despite my attempts at proofreading.

## 2025-01-13 NOTE — PROGRESS NOTES
Physical Therapy Treatment    Patient Name: Kamille Cotto Sr.  MRN: [id number]  Today's Date: 1/13/2025  Visit [birth year]59  DOS/DOI:    6/1/24  Referred by:   Nick Mitchell     PT Therapeutic Procedures Time Entry  Therapeutic Exercise Time Entry: 55                 Diagnosis:   1. Lumbar radiculopathy  Follow Up In Physical Therapy            PRECAUTIONS:  Avoid extension    SUBJECTIVE:  He saw pain management this morning and was scheduled for an injection.  He had muscle soreness after his last session but had no increase in pain.  Today he reports 2-3/10  L LB and glut pain.     HEP compliance:  no    OBJECTIVE:  -TA = strong    Treatment:  - continued with core stab exercise.  Increased reps.  Progressed multifidus strengthening to seated shld flex  Therapeutic Exercise  Therapeutic Exercise Activity 1: TA with hip add x10  Therapeutic Exercise Activity 2: knee tap 3x12  Therapeutic Exercise Activity 4: seated shld flex for multifdus, B 3lbs, x12  Therapeutic Exercise Activity 5: bridge 3x12  Therapeutic Exercise Activity 6: hip abd B 3x12  Therapeutic Exercise Activity 7: hip ext in lumbar flex, 5 sec hold x10 B              - given a HEP in standing position so he can better perform exercises while at his job    Access Code: [email or web address]  URL: [email or web address]  Date: 01/13/2025  Prepared by: Adia Fink    Exercises  - Standing Hamstring Stretch with Step  - 1 x daily - 7 x weekly - 1 sets - 3 reps - 30 sec hold  - Standing Transverse Abdominis Contraction  - 1 x daily - 7 x weekly - 3 sets - 10 reps  - Standing Hip Abduction with Counter Support  - 1 x daily - 7 x weekly - 2-3 sets - 10 reps  - Squat with Chair Touch  - 1 x daily - 7 x weekly - 2-3 sets - 10 reps    ASSESSMENT:  Good moe of exercises - has no pain during any activity.  He will benefit from continued therapy to further improve strength of glut, abs and multifidus to improve moe to standing and walking ADLS.    PLAN:  Add  WB core stab as moe.

## 2025-01-21 ENCOUNTER — APPOINTMENT (OUTPATIENT)
Dept: PHYSICAL THERAPY | Facility: CLINIC | Age: 60
End: 2025-01-21
Payer: COMMERCIAL

## 2025-01-23 ENCOUNTER — APPOINTMENT (OUTPATIENT)
Dept: PHYSICAL THERAPY | Facility: CLINIC | Age: 60
End: 2025-01-23
Payer: COMMERCIAL

## 2025-01-28 ENCOUNTER — TREATMENT (OUTPATIENT)
Dept: PHYSICAL THERAPY | Facility: CLINIC | Age: 60
End: 2025-01-28
Payer: COMMERCIAL

## 2025-01-28 DIAGNOSIS — M54.16 LUMBAR RADICULOPATHY: ICD-10-CM

## 2025-01-28 PROCEDURE — 97110 THERAPEUTIC EXERCISES: CPT | Mod: GP | Performed by: PHYSICAL THERAPIST

## 2025-01-28 NOTE — PROGRESS NOTES
Physical Therapy Treatment    Patient Name: Kamille Cotto Sr.  MRN: [id number]  Today's Date: 1/28/2025  Visit [birth year]  DOS/DOI: 6/1/24  Referred by:  Nick Mitchell   Time Calculation  Start Time: 1723  Stop Time: 1813  Time Calculation (min): 50 min     PT Therapeutic Procedures Time Entry  Therapeutic Exercise Time Entry: 50                 Diagnosis:   1. Lumbar radiculopathy  Follow Up In Physical Therapy            PRECAUTIONS:  Avoid extension     SUBJECTIVE:  Pt slipped on the ice and fell last Friday.  Doesn't report any injury or change in back symptoms.  Reports he has 2/10 pain prior to today's session in the low back around the belt line and into the glut.  Reports he had no aggravation after last session.        HEP compliance: partially, reports he has done some of his exercises but not all of them.      OBJECTIVE:  -Good stability with sidelying hip abduction B  -Strong TA activation    Treatment: continued with core stability this session, added stability ex's in WB     Therapeutic Exercise  Therapeutic Exercise Activity 2: knee tap 3x12  Therapeutic Exercise Activity 3: clamshell PPL TB, 2x 12  Therapeutic Exercise Activity 4: seated shld flex for multifdus, B 3lbs,2 x12  Therapeutic Exercise Activity 5: bridge 3x12  Therapeutic Exercise Activity 6: hip abd B 3x12  Therapeutic Exercise Activity 7: hip ext in lumbar flex, 5 sec hold x10 B  Therapeutic Exercise Activity 8: Rows, 35 lbs, 2x12  Therapeutic Exercise Activity 9: B Shoulder extension, 35 lbs, 2x12              ASSESSMENT:  Pt tolerated today's session well.  Reports he had no pain during the session from the exercises but did have good muscle burn.  Core stability in WB was added this session and the pt reports an increase to 2/10 pain in the low back and glut with the prolonged standing of those 2 exercises.  Pt would benefit from continued skilled therapy to improve core stability needed for pain free ADLs.    Active       PT Problem        he will be independent with his HEP       Start:  12/24/24    Expected End:  12/31/24            achieve full AROM of the lumbar spine without pain       Start:  12/24/24    Expected End:  01/14/25            report no episodes of the back locking up or preventing him from moving.       Start:  12/24/24    Expected End:  01/28/25            perform lifting ADLS at home and work without increase in pain.       Start:  12/24/24    Expected End:  02/11/25            demonstrate upright posture without increased pain       Start:  12/24/24    Expected End:  01/14/25              PLAN: continue with core stability, progress WB activities as tolerated

## 2025-01-31 ENCOUNTER — HOSPITAL ENCOUNTER (OUTPATIENT)
Dept: GASTROENTEROLOGY | Facility: HOSPITAL | Age: 60
Discharge: HOME | End: 2025-01-31
Payer: COMMERCIAL

## 2025-01-31 VITALS
BODY MASS INDEX: 30.48 KG/M2 | TEMPERATURE: 97.4 F | OXYGEN SATURATION: 96 % | DIASTOLIC BLOOD PRESSURE: 95 MMHG | SYSTOLIC BLOOD PRESSURE: 139 MMHG | RESPIRATION RATE: 16 BRPM | WEIGHT: 225 LBS | HEART RATE: 68 BPM | HEIGHT: 72 IN

## 2025-01-31 DIAGNOSIS — M54.16 LUMBAR RADICULOPATHY: ICD-10-CM

## 2025-01-31 PROCEDURE — 2500000002 HC RX 250 W HCPCS SELF ADMINISTERED DRUGS (ALT 637 FOR MEDICARE OP, ALT 636 FOR OP/ED): Performed by: PHYSICAL MEDICINE & REHABILITATION

## 2025-01-31 PROCEDURE — 62323 NJX INTERLAMINAR LMBR/SAC: CPT | Performed by: PHYSICAL MEDICINE & REHABILITATION

## 2025-01-31 PROCEDURE — 2500000004 HC RX 250 GENERAL PHARMACY W/ HCPCS (ALT 636 FOR OP/ED): Mod: JZ | Performed by: PHYSICAL MEDICINE & REHABILITATION

## 2025-01-31 PROCEDURE — 2550000001 HC RX 255 CONTRASTS: Performed by: PHYSICAL MEDICINE & REHABILITATION

## 2025-01-31 RX ORDER — LIDOCAINE HYDROCHLORIDE 5 MG/ML
INJECTION, SOLUTION INFILTRATION; INTRAVENOUS AS NEEDED
Status: COMPLETED | OUTPATIENT
Start: 2025-01-31 | End: 2025-01-31

## 2025-01-31 RX ORDER — METHYLPREDNISOLONE ACETATE 40 MG/ML
INJECTION, SUSPENSION INTRA-ARTICULAR; INTRALESIONAL; INTRAMUSCULAR; SOFT TISSUE AS NEEDED
Status: COMPLETED | OUTPATIENT
Start: 2025-01-31 | End: 2025-01-31

## 2025-01-31 RX ORDER — DIAZEPAM 5 MG/1
5 TABLET ORAL ONCE AS NEEDED
Status: COMPLETED | OUTPATIENT
Start: 2025-01-31 | End: 2025-01-31

## 2025-01-31 RX ADMIN — DIAZEPAM 5 MG: 5 TABLET ORAL at 10:45

## 2025-01-31 RX ADMIN — LIDOCAINE HYDROCHLORIDE 15 ML: 5 INJECTION, SOLUTION INFILTRATION at 11:28

## 2025-01-31 RX ADMIN — IOHEXOL 5 ML: 350 INJECTION, SOLUTION INTRAVENOUS at 11:28

## 2025-01-31 RX ADMIN — METHYLPREDNISOLONE ACETATE 40 MG: 40 INJECTION, SUSPENSION INTRA-ARTICULAR; INTRALESIONAL; INTRAMUSCULAR; SOFT TISSUE at 11:29

## 2025-01-31 ASSESSMENT — PAIN - FUNCTIONAL ASSESSMENT
PAIN_FUNCTIONAL_ASSESSMENT: 0-10
PAIN_FUNCTIONAL_ASSESSMENT: 0-10

## 2025-01-31 ASSESSMENT — COLUMBIA-SUICIDE SEVERITY RATING SCALE - C-SSRS
6. HAVE YOU EVER DONE ANYTHING, STARTED TO DO ANYTHING, OR PREPARED TO DO ANYTHING TO END YOUR LIFE?: NO
1. IN THE PAST MONTH, HAVE YOU WISHED YOU WERE DEAD OR WISHED YOU COULD GO TO SLEEP AND NOT WAKE UP?: NO
2. HAVE YOU ACTUALLY HAD ANY THOUGHTS OF KILLING YOURSELF?: NO

## 2025-01-31 ASSESSMENT — PAIN SCALES - GENERAL
PAINLEVEL_OUTOF10: 5 - MODERATE PAIN
PAINLEVEL_OUTOF10: 1

## 2025-01-31 NOTE — DISCHARGE INSTRUCTIONS
You had a pain management procedure today.    Observe/ monitor for the following signs & symptoms:  If you notice Excessive bleeding (slow general oozing that completely soaks the dressing, or fresh bright red bleeding).   In either case, apply pressure to the area, elevate it if possible & call your doctor at once.    Also observe for:  Change in color  Numbness/tingling  Coldness to the touch  Swelling  Drainage  Temperature of 101.5 or higher.  Increased, uncontrollable pain.    *If you notice the above signs & symptoms, please call your doctor right away!*      Discharge Instructions:    Your pain may not be gone immediately after this procedure; it generally takes 3 to 5 days for the steroid to work.   Keep the needle site clean & dry for 24 hours.  Continue your present medications.  Make an appointment to see your doctor in 2-3 weeks.  If any problems occur, or if you have any further questions, please call as soon as possible. If you find that you cannot reach your doctor, but feel that the condition nees a doctor's attention, go to the closest emergency department & take this discharge paper with you.       Dr. Serrano's Office: [phone]

## 2025-02-06 ENCOUNTER — TREATMENT (OUTPATIENT)
Dept: PHYSICAL THERAPY | Facility: CLINIC | Age: 60
End: 2025-02-06
Payer: COMMERCIAL

## 2025-02-06 DIAGNOSIS — M54.16 LUMBAR RADICULOPATHY: ICD-10-CM

## 2025-02-06 PROCEDURE — 97110 THERAPEUTIC EXERCISES: CPT | Mod: GP

## 2025-02-06 NOTE — PROGRESS NOTES
Physical Therapy Treatment    Patient Name: Kamille Cotto Sr.  MRN: [id number]  Today's Date: 2/6/2025  Visit [birth year]  DOS/DOI: 6/1/24  Referred by:  Nick Mitchell  Time Calculation  Start Time: 1620  Stop Time: 1702  Time Calculation (min): 42 min     PT Therapeutic Procedures Time Entry  Therapeutic Exercise Time Entry: 42                 Diagnosis:   1. Lumbar radiculopathy  Follow Up In Physical Therapy            PRECAUTIONS:  Avoid extension     SUBJECTIVE:  Reports the back is about the same as it was last time.  Had injection Friday but reports feeling no relief from it.  Reports 0/10 pain prior to today's session.       HEP compliance: partially, doing them sometimes but says not as much as he should    OBJECTIVE:  MMT  Hip Abduction= 5/5 B  TA= strong  Multifidus activation=strong    Treatment: continued with core stability ex's this session, added resisted side stepping with PPL TB    Therapeutic Exercise  Therapeutic Exercise Activity 2: knee tap 3x12  Therapeutic Exercise Activity 3: clamshell PPL TB, 2x 12  Therapeutic Exercise Activity 4: seated shld flex for multifdus, B 3lbs,2 x12  Therapeutic Exercise Activity 5: bridge 3x12  Therapeutic Exercise Activity 6: hip abd B 3x12  Therapeutic Exercise Activity 7: hip ext in lumbar flex, 2 x10 B  Therapeutic Exercise Activity 8: Rows, 35 lbs, 2x12  Therapeutic Exercise Activity 9: B Shoulder extension, 35 lbs, 2x12  Therapeutic Exercise Activity 10: Resisted sidestepping, 5x10ft, PPL TB                ASSESSMENT:  Pt tolerated today's session well.  Pt reports good muscle burn at the end of today's session without increase in symptoms.  Resisted side stepping added this session and pt reported this was a good challenge.  Pt requires further skilled therapy to continue progressing core strength and stability needed for pain free ADLs and work activities.    Active       PT Problem       he will be independent with his HEP       Start:  12/24/24    Expected  End:  12/31/24            achieve full AROM of the lumbar spine without pain       Start:  12/24/24    Expected End:  01/14/25            report no episodes of the back locking up or preventing him from moving.       Start:  12/24/24    Expected End:  01/28/25            perform lifting ADLS at home and work without increase in pain.       Start:  12/24/24    Expected End:  02/11/25            demonstrate upright posture without increased pain       Start:  12/24/24    Expected End:  01/14/25              PLAN: continue with core stability ex's on mat and in WB, progress as tolerated

## 2025-02-13 ENCOUNTER — APPOINTMENT (OUTPATIENT)
Dept: PHYSICAL THERAPY | Facility: CLINIC | Age: 60
End: 2025-02-13
Payer: COMMERCIAL

## 2025-02-20 ENCOUNTER — TREATMENT (OUTPATIENT)
Dept: PHYSICAL THERAPY | Facility: CLINIC | Age: 60
End: 2025-02-20
Payer: COMMERCIAL

## 2025-02-20 DIAGNOSIS — M54.16 LUMBAR RADICULOPATHY: ICD-10-CM

## 2025-02-20 PROCEDURE — 97140 MANUAL THERAPY 1/> REGIONS: CPT | Mod: GP | Performed by: PHYSICAL THERAPIST

## 2025-02-20 PROCEDURE — 97110 THERAPEUTIC EXERCISES: CPT | Mod: GP | Performed by: PHYSICAL THERAPIST

## 2025-02-20 NOTE — PROGRESS NOTES
Physical Therapy Treatment    Patient Name: Kamille Cotto Sr.  MRN: [id number]  Today's Date: 2/20/2025  Visit [birth year]  DOS/DOI: 6/1/24  Referred by:  Nick Mitchell  Time Calculation  Start Time: 1732  Stop Time: 1820  Time Calculation (min): 48 min     PT Therapeutic Procedures Time Entry  Manual Therapy Time Entry: 10  Therapeutic Exercise Time Entry: 38                 Diagnosis:   1. Lumbar radiculopathy  Follow Up In Physical Therapy            PRECAUTIONS:  Avoid extension    SUBJECTIVE:  He had pain for 1-2 days after last session but thinks it is work relates.  He has had to shovel snow and be on his feet a lot.  Today he rates it 4-5/10 and located at the L low back and glut.  HEP compliance: intermittently    OBJECTIVE:  - walks slowly with lumbar spine in slight flex.    Treatment:  - added manual therapy to stretch the L LB and hip mm.  Continued with core stab exercise in NWB and WB positions.  increased reps as moe.  Therapeutic Exercise  Therapeutic Exercise Activity 1: lunbar rot supine x10  Therapeutic Exercise Activity 2: knee tap 3x12  Therapeutic Exercise Activity 3: clamshell PPL TB, 3x 12  Therapeutic Exercise Activity 4: seated shld flex for multifdus, B 3lbs,2 x12  Therapeutic Exercise Activity 5: bridge 3x12  Therapeutic Exercise Activity 6: hip abd B 3x12  Therapeutic Exercise Activity 8: Rows, 35 lbs, 3x12  Therapeutic Exercise Activity 9: B Shoulder extension, 35 lbs, 3x12  Manual Therapy  Manual Therapy Activity 1: L lower lumbar gap mob  Manual Therapy Activity 2: L piriformis stretch  Manual Therapy Activity 3: L hip PROM flex, ER and abd             ASSESSMENT:  Moe rx well - reports decreased pain after rx, but does have 3-4/10 soreness in the L LB with standing.  He will benefit from continued therapy to further improve core strength.      Active       PT Problem       he will be independent with his HEP       Start:  12/24/24    Expected End:  12/31/24            achieve full AROM of  the lumbar spine without pain       Start:  12/24/24    Expected End:  01/14/25            report no episodes of the back locking up or preventing him from moving.       Start:  12/24/24    Expected End:  01/28/25            perform lifting ADLS at home and work without increase in pain.       Start:  12/24/24    Expected End:  02/11/25            demonstrate upright posture without increased pain       Start:  12/24/24    Expected End:  01/14/25              PLAN:  Resume lateral step with band.  Add dead bug

## 2025-02-24 ENCOUNTER — OFFICE VISIT (OUTPATIENT)
Dept: PRIMARY CARE | Facility: CLINIC | Age: 60
End: 2025-02-24
Payer: COMMERCIAL

## 2025-02-24 ENCOUNTER — OFFICE VISIT (OUTPATIENT)
Dept: URGENT CARE | Age: 60
End: 2025-02-24
Payer: COMMERCIAL

## 2025-02-24 VITALS
SYSTOLIC BLOOD PRESSURE: 149 MMHG | TEMPERATURE: 98.2 F | HEART RATE: 89 BPM | DIASTOLIC BLOOD PRESSURE: 96 MMHG | OXYGEN SATURATION: 98 %

## 2025-02-24 VITALS
HEART RATE: 89 BPM | OXYGEN SATURATION: 98 % | WEIGHT: 231 LBS | RESPIRATION RATE: 16 BRPM | HEIGHT: 72 IN | BODY MASS INDEX: 31.29 KG/M2 | DIASTOLIC BLOOD PRESSURE: 93 MMHG | SYSTOLIC BLOOD PRESSURE: 144 MMHG | TEMPERATURE: 96.8 F

## 2025-02-24 DIAGNOSIS — I10 BENIGN ESSENTIAL HYPERTENSION: Primary | ICD-10-CM

## 2025-02-24 DIAGNOSIS — R07.89 ATYPICAL CHEST PAIN: ICD-10-CM

## 2025-02-24 DIAGNOSIS — R07.89 SENSATION OF CHEST TIGHTNESS: ICD-10-CM

## 2025-02-24 DIAGNOSIS — I10 PRIMARY HYPERTENSION: Primary | ICD-10-CM

## 2025-02-24 PROCEDURE — 3008F BODY MASS INDEX DOCD: CPT | Performed by: STUDENT IN AN ORGANIZED HEALTH CARE EDUCATION/TRAINING PROGRAM

## 2025-02-24 PROCEDURE — 99204 OFFICE O/P NEW MOD 45 MIN: CPT | Performed by: NURSE PRACTITIONER

## 2025-02-24 PROCEDURE — 99214 OFFICE O/P EST MOD 30 MIN: CPT | Performed by: STUDENT IN AN ORGANIZED HEALTH CARE EDUCATION/TRAINING PROGRAM

## 2025-02-24 PROCEDURE — 3077F SYST BP >= 140 MM HG: CPT | Performed by: NURSE PRACTITIONER

## 2025-02-24 PROCEDURE — 3077F SYST BP >= 140 MM HG: CPT | Performed by: STUDENT IN AN ORGANIZED HEALTH CARE EDUCATION/TRAINING PROGRAM

## 2025-02-24 PROCEDURE — 3080F DIAST BP >= 90 MM HG: CPT | Performed by: NURSE PRACTITIONER

## 2025-02-24 PROCEDURE — 3080F DIAST BP >= 90 MM HG: CPT | Performed by: STUDENT IN AN ORGANIZED HEALTH CARE EDUCATION/TRAINING PROGRAM

## 2025-02-24 PROCEDURE — 1036F TOBACCO NON-USER: CPT | Performed by: NURSE PRACTITIONER

## 2025-02-24 RX ORDER — DOBUTAMINE HYDROCHLORIDE 100 MG/100ML
5-40 INJECTION INTRAVENOUS CONTINUOUS
OUTPATIENT
Start: 2025-02-24

## 2025-02-24 RX ORDER — ATROPINE SULFATE 0.1 MG/ML
.25-5 INJECTION INTRAVENOUS ONCE AS NEEDED
OUTPATIENT
Start: 2025-02-24

## 2025-02-24 RX ORDER — LISINOPRIL 5 MG/1
5 TABLET ORAL DAILY
Qty: 30 TABLET | Refills: 3 | Status: SHIPPED | OUTPATIENT
Start: 2025-02-24 | End: 2025-06-24

## 2025-02-24 SDOH — ECONOMIC STABILITY: FOOD INSECURITY: WITHIN THE PAST 12 MONTHS, YOU WORRIED THAT YOUR FOOD WOULD RUN OUT BEFORE YOU GOT MONEY TO BUY MORE.: NEVER TRUE

## 2025-02-24 SDOH — ECONOMIC STABILITY: FOOD INSECURITY: WITHIN THE PAST 12 MONTHS, THE FOOD YOU BOUGHT JUST DIDN'T LAST AND YOU DIDN'T HAVE MONEY TO GET MORE.: NEVER TRUE

## 2025-02-24 ASSESSMENT — ENCOUNTER SYMPTOMS
MYALGIAS: 0
PALPITATIONS: 0
SHORTNESS OF BREATH: 0
ACTIVITY CHANGE: 0
DIZZINESS: 0
HEADACHES: 0
DIZZINESS: 0
WHEEZING: 0
FATIGUE: 0
HEADACHES: 0
COLOR CHANGE: 0
SHORTNESS OF BREATH: 0
ABDOMINAL PAIN: 0
FEVER: 0
NAUSEA: 0
COUGH: 0
FEVER: 0
VOMITING: 0
ABDOMINAL PAIN: 0
CONFUSION: 0
CHILLS: 0
HYPERTENSION: 1
APPETITE CHANGE: 0
UNEXPECTED WEIGHT CHANGE: 0
MUSCULOSKELETAL NEGATIVE: 1
FATIGUE: 0
CONSTIPATION: 0
DIARRHEA: 0
PHOTOPHOBIA: 0

## 2025-02-24 ASSESSMENT — LIFESTYLE VARIABLES
AUDIT-C TOTAL SCORE: 1
HOW OFTEN DO YOU HAVE A DRINK CONTAINING ALCOHOL: MONTHLY OR LESS
HOW OFTEN DO YOU HAVE SIX OR MORE DRINKS ON ONE OCCASION: NEVER
SKIP TO QUESTIONS 9-10: 1
HOW MANY STANDARD DRINKS CONTAINING ALCOHOL DO YOU HAVE ON A TYPICAL DAY: 1 OR 2

## 2025-02-24 ASSESSMENT — PATIENT HEALTH QUESTIONNAIRE - PHQ9
1. LITTLE INTEREST OR PLEASURE IN DOING THINGS: NOT AT ALL
2. FEELING DOWN, DEPRESSED OR HOPELESS: NOT AT ALL
SUM OF ALL RESPONSES TO PHQ9 QUESTIONS 1 & 2: 0

## 2025-02-24 NOTE — PROGRESS NOTES
Subjective   Patient ID: Kamille Cotto Sr. is a 59 y.o. male. They present today with a chief complaint of Hypertension.    History of Present Illness    History provided by:  Patient  Hypertension  Associated symptoms: no abdominal pain, no chest pain, no dizziness, no fatigue, no fever, no headaches and no shortness of breath      Patient presents today with wife due to concerns of a high blood pressure. They were measuring the BP using a wrist monitor last night after dinner and noticed a bp of 180's. They came in today to verify the BP. Patient is compliant with medication regimen of diltiazem. Denies headache/eye sensitivities.     Past Medical History  Allergies as of 02/24/2025 - Reviewed 02/24/2025   Allergen Reaction Noted    Bacitracin Swelling and Unknown 11/07/2019    Morphine Unknown 04/28/2023    Penicillins Diarrhea and Unknown 11/07/2019       (Not in a hospital admission)       Past Medical History:   Diagnosis Date    Anxiety disorder, unspecified     Anxiety    Other specified abnormal findings of blood chemistry 07/20/2020    Low testosterone    Personal history of other diseases of the circulatory system     History of paroxysmal supraventricular tachycardia    Personal history of other specified conditions     History of palpitations       Past Surgical History:   Procedure Laterality Date    OTHER SURGICAL HISTORY  09/23/2021    Cholecystectomy        reports that he quit smoking about 12 years ago. His smoking use included cigarettes. He has been exposed to tobacco smoke. He has quit using smokeless tobacco.  His smokeless tobacco use included chew. He reports current alcohol use. He reports that he does not currently use drugs.    Review of Systems  Review of Systems   Constitutional:  Negative for activity change, appetite change, fatigue and fever.   Eyes:  Negative for photophobia and visual disturbance.   Respiratory:  Negative for shortness of breath.    Cardiovascular:  Negative for  chest pain.   Gastrointestinal:  Negative for abdominal pain.   Musculoskeletal:  Negative for myalgias.   Skin:  Negative for rash.   Neurological:  Negative for dizziness and headaches.                                  Objective    Vitals:    02/24/25 0935   BP: (!) 149/96   Pulse: 89   Temp: 36.8 °C (98.2 °F)   SpO2: 98%     No LMP for male patient.    Physical Exam  Vitals reviewed.   Constitutional:       Appearance: Normal appearance.   Cardiovascular:      Rate and Rhythm: Normal rate and regular rhythm.   Pulmonary:      Effort: Pulmonary effort is normal.      Breath sounds: Normal breath sounds.   Skin:     General: Skin is warm and dry.   Neurological:      Mental Status: He is alert.   Psychiatric:         Mood and Affect: Mood normal.         Thought Content: Thought content normal.         Procedures    Point of Care Test & Imaging Results from this visit  No results found for this visit on 02/24/25.   No results found.    Diagnostic study results (if any) were reviewed by CHARISSA Coates.    Assessment/Plan   Allergies, medications, history, and pertinent labs/EKGs/Imaging reviewed by CHARISSA Coates.     Medical Decision Making  Discussed with patient to follow up with PCP regarding elevated BP. He is on meloxicam daily and we discussed this can drive BP up. Advised to get an automatic arm cuff. Red flags reviewed. They will contact pcp.     Orders and Diagnoses  Diagnoses and all orders for this visit:  Benign essential hypertension      Medical Admin Record      Patient disposition: Home    Electronically signed by CHARISSA Coates  10:04 AM

## 2025-02-24 NOTE — PROGRESS NOTES
Subjective   Patient ID: Kamille Cotto Sr. is a 59 y.o. male who presents for Sick Visit (Pt c/o HTN issues, beginning early January.  Has been monitoring since then and it is repeatedly high.).    HPI   He is here for follow BP follow up. States he has been elevated BP in the last few mo, his BP was high at [facility] physical and yesterday home bP 186/104; went to  this am and BP was 149/96. He has been taking diltZ 180 mg daily; his IO /93.   Also reports occa pinching chest pain & tightness in the area, no particular triggered, comes randomly. He is not a smoker, chew tobacco. His last lipid (10/22/24) close to goal with tchol 160, HDL 43 & LDL 69. Reports fh/o CAD in father at the age of 63 but he wasn't compliant with hospital/medical follow up. He denies chest pain currently, no SOB, HA and other asso sx.     Review of Systems   Constitutional:  Negative for chills, fatigue, fever and unexpected weight change.   HENT: Negative.     Respiratory:  Negative for cough, shortness of breath and wheezing.    Cardiovascular:  Negative for chest pain, palpitations and leg swelling.   Gastrointestinal:  Negative for abdominal pain, constipation, diarrhea, nausea and vomiting.   Musculoskeletal: Negative.    Skin:  Negative for color change and rash.   Neurological:  Negative for dizziness and headaches.   Psychiatric/Behavioral:  Negative for behavioral problems and confusion.        Objective   BP (!) 144/93 (BP Location: Right arm, Patient Position: Sitting, BP Cuff Size: Adult)   Pulse 89   Temp 36 °C (96.8 °F) (Temporal)   Resp 16   Ht 1.829 m (6')   Wt 105 kg (231 lb)   SpO2 98%   BMI 31.33 kg/m²     Physical Exam  Vitals and nursing note reviewed.   Constitutional:       Appearance: Normal appearance. He is obese.   Cardiovascular:      Rate and Rhythm: Normal rate and regular rhythm.      Pulses: Normal pulses.      Heart sounds: Normal heart sounds.   Pulmonary:      Effort: Pulmonary effort is normal.       Breath sounds: Normal breath sounds.   Abdominal:      General: Abdomen is flat. Bowel sounds are normal.      Palpations: Abdomen is soft.   Musculoskeletal:         General: Normal range of motion.   Neurological:      General: No focal deficit present.      Mental Status: He is alert.   Psychiatric:         Mood and Affect: Mood normal.         Behavior: Behavior normal.       Assessment/Plan   He is here for BP follow up. Appears BP remains elevated both home & IO; will add lisinopril 5 mg daily. Cont dilt  mg daily as usual. Follow DASH diet. Bring in 1-2 wks for BP check with MA.  Also having intermittent chest tightness/pinching sensation, could be cardiac vs other; added stress echo to eval further. Also rec to start taking baby ASA daily; cont statin as usual. Seek ER care if sx worsens or new concerning sx. Pt & his wife verbalized understanding and agree with the plan. Other chronic problems are stable; continue all medications as usual.     Problem List Items Addressed This Visit    None  Visit Diagnoses         Codes    Primary hypertension    -  Primary I10    Relevant Medications    lisinopril 5 mg tablet    Atypical chest pain     R07.89    Relevant Orders    Echocardiogram Stress Test    Sensation of chest tightness     R07.89    Relevant Orders    Echocardiogram Stress Test          Rtc 2-3 mo for FU     This note was partially generated using the Dragon Voice recognition system. There may be some incorrect wording, spelling and/or spelling errors or punctuation errors that were not corrected prior to committing the note to the medical record.      Ricardo Ramírez MD    [facility]

## 2025-02-27 ENCOUNTER — APPOINTMENT (OUTPATIENT)
Dept: PHYSICAL THERAPY | Facility: CLINIC | Age: 60
End: 2025-02-27
Payer: COMMERCIAL

## 2025-02-27 DIAGNOSIS — M54.16 LUMBAR RADICULOPATHY: ICD-10-CM

## 2025-02-28 RX ORDER — GABAPENTIN 300 MG/1
300 CAPSULE ORAL
Qty: 90 CAPSULE | Refills: 3 | Status: SHIPPED | OUTPATIENT
Start: 2025-02-28

## 2025-03-02 DIAGNOSIS — M54.9 CHRONIC BACK PAIN, UNSPECIFIED BACK LOCATION, UNSPECIFIED BACK PAIN LATERALITY: ICD-10-CM

## 2025-03-02 DIAGNOSIS — G89.29 CHRONIC BACK PAIN, UNSPECIFIED BACK LOCATION, UNSPECIFIED BACK PAIN LATERALITY: ICD-10-CM

## 2025-03-04 RX ORDER — MELOXICAM 15 MG/1
TABLET ORAL
Qty: 90 TABLET | Refills: 0 | Status: SHIPPED | OUTPATIENT
Start: 2025-03-04

## 2025-03-11 ENCOUNTER — APPOINTMENT (OUTPATIENT)
Dept: PAIN MEDICINE | Facility: HOSPITAL | Age: 60
End: 2025-03-11
Payer: COMMERCIAL

## 2025-03-11 ENCOUNTER — APPOINTMENT (OUTPATIENT)
Dept: PRIMARY CARE | Facility: CLINIC | Age: 60
End: 2025-03-11
Payer: COMMERCIAL

## 2025-03-11 VITALS
TEMPERATURE: 96.9 F | OXYGEN SATURATION: 96 % | RESPIRATION RATE: 16 BRPM | HEART RATE: 90 BPM | DIASTOLIC BLOOD PRESSURE: 82 MMHG | SYSTOLIC BLOOD PRESSURE: 119 MMHG

## 2025-03-11 DIAGNOSIS — I10 BENIGN ESSENTIAL HYPERTENSION: ICD-10-CM

## 2025-03-11 PROCEDURE — 99211 OFF/OP EST MAY X REQ PHY/QHP: CPT | Performed by: STUDENT IN AN ORGANIZED HEALTH CARE EDUCATION/TRAINING PROGRAM

## 2025-03-11 NOTE — Clinical Note
Pt is here for a BP check.  Pt was brought back to exam room.   BP improved today to 119/82. Pt states he is taking all prescribed meds.

## 2025-03-18 ENCOUNTER — HOSPITAL ENCOUNTER (OUTPATIENT)
Dept: CARDIOLOGY | Facility: HOSPITAL | Age: 60
Discharge: HOME | End: 2025-03-18
Payer: COMMERCIAL

## 2025-03-18 ENCOUNTER — OFFICE VISIT (OUTPATIENT)
Dept: PAIN MEDICINE | Facility: HOSPITAL | Age: 60
End: 2025-03-18
Payer: COMMERCIAL

## 2025-03-18 VITALS
OXYGEN SATURATION: 97 % | WEIGHT: 233.2 LBS | HEIGHT: 72 IN | HEART RATE: 76 BPM | SYSTOLIC BLOOD PRESSURE: 124 MMHG | BODY MASS INDEX: 31.59 KG/M2 | DIASTOLIC BLOOD PRESSURE: 85 MMHG | RESPIRATION RATE: 16 BRPM

## 2025-03-18 DIAGNOSIS — R07.89 ATYPICAL CHEST PAIN: ICD-10-CM

## 2025-03-18 DIAGNOSIS — R07.89 SENSATION OF CHEST TIGHTNESS: ICD-10-CM

## 2025-03-18 DIAGNOSIS — M54.16 LUMBAR RADICULOPATHY: Primary | ICD-10-CM

## 2025-03-18 PROCEDURE — 99214 OFFICE O/P EST MOD 30 MIN: CPT | Performed by: CLINICAL NURSE SPECIALIST

## 2025-03-18 PROCEDURE — 93016 CV STRESS TEST SUPVJ ONLY: CPT | Performed by: INTERNAL MEDICINE

## 2025-03-18 PROCEDURE — 2500000004 HC RX 250 GENERAL PHARMACY W/ HCPCS (ALT 636 FOR OP/ED): Performed by: INTERNAL MEDICINE

## 2025-03-18 PROCEDURE — 2500000004 HC RX 250 GENERAL PHARMACY W/ HCPCS (ALT 636 FOR OP/ED): Performed by: STUDENT IN AN ORGANIZED HEALTH CARE EDUCATION/TRAINING PROGRAM

## 2025-03-18 PROCEDURE — 3008F BODY MASS INDEX DOCD: CPT | Performed by: CLINICAL NURSE SPECIALIST

## 2025-03-18 PROCEDURE — 93350 STRESS TTE ONLY: CPT | Performed by: INTERNAL MEDICINE

## 2025-03-18 PROCEDURE — 93017 CV STRESS TEST TRACING ONLY: CPT

## 2025-03-18 PROCEDURE — 93018 CV STRESS TEST I&R ONLY: CPT | Performed by: INTERNAL MEDICINE

## 2025-03-18 PROCEDURE — 99214 OFFICE O/P EST MOD 30 MIN: CPT | Mod: 25 | Performed by: CLINICAL NURSE SPECIALIST

## 2025-03-18 PROCEDURE — 3079F DIAST BP 80-89 MM HG: CPT | Performed by: CLINICAL NURSE SPECIALIST

## 2025-03-18 PROCEDURE — 3074F SYST BP LT 130 MM HG: CPT | Performed by: CLINICAL NURSE SPECIALIST

## 2025-03-18 RX ORDER — DOBUTAMINE HYDROCHLORIDE 100 MG/100ML
5-40 INJECTION INTRAVENOUS CONTINUOUS
Status: DISCONTINUED | OUTPATIENT
Start: 2025-03-18 | End: 2025-03-19 | Stop reason: HOSPADM

## 2025-03-18 RX ORDER — ATROPINE SULFATE 0.1 MG/ML
.25-5 INJECTION INTRAVENOUS ONCE AS NEEDED
Status: DISCONTINUED | OUTPATIENT
Start: 2025-03-18 | End: 2025-03-19 | Stop reason: HOSPADM

## 2025-03-18 RX ORDER — METOPROLOL TARTRATE 1 MG/ML
5 INJECTION, SOLUTION INTRAVENOUS ONCE
Status: COMPLETED | OUTPATIENT
Start: 2025-03-18 | End: 2025-03-18

## 2025-03-18 RX ORDER — GABAPENTIN 300 MG/1
600 CAPSULE ORAL NIGHTLY
Qty: 60 CAPSULE | Refills: 2 | Status: SHIPPED | OUTPATIENT
Start: 2025-03-18 | End: 2025-04-17

## 2025-03-18 RX ADMIN — DOBUTAMINE HYDROCHLORIDE 30 MCG/KG/MIN: 100 INJECTION INTRAVENOUS at 11:40

## 2025-03-18 RX ADMIN — METOPROLOL TARTRATE 5 MG: 5 INJECTION, SOLUTION INTRAVENOUS at 11:41

## 2025-03-18 RX ADMIN — PERFLUTREN 8 ML OF DILUTION: 6.52 INJECTION, SUSPENSION INTRAVENOUS at 11:06

## 2025-03-18 RX ADMIN — ATROPINE SULFATE 0.5 MG: 0.1 INJECTION, SOLUTION ENDOTRACHEAL; INTRAMUSCULAR; INTRAVENOUS; SUBCUTANEOUS at 11:39

## 2025-03-18 ASSESSMENT — PAIN SCALES - GENERAL: PAINLEVEL_OUTOF10: 3

## 2025-03-18 NOTE — ASSESSMENT & PLAN NOTE
59-year-old male presents for follow-up of lower back pain with left lower extremity radicular symptoms in roughly an L5/S1 distribution.  Previous x-ray of his lumbar spine consistent with degenerative changes at L3-4, L4-5 and L5-S1;L5-S1 he also has some fairly significant grade 2 anterolisthesis of L5 on S1.  At that time patient had failed conservative treatment and was scheduled for an interlaminar lumbar epidural steroid injection at L4-5.  Given the amount of listhesis at L5-S1 Dr. Serrano chose to go to the level above and allow the medication to flow down.  Patient also started on gabapentin 300 mg at bedtime.  Continued meloxicam for polyarticular symptoms.  He was continuing physical therapy.  Presenting at today's office visit for follow-up after his recent injection.  The patient states that he received approximately 50% relief after about 10 days.  Relief lasted several weeks and has worn off.  He feels that he is receiving minimal relief from this injection at this time.  Continues to experience lumbar radicular symptoms radiating most often to his left lower extremity accompanied by neuropathic pain and intermittent weakness.  Denies any bowel/bladder changes.  Pain most intense when he is standing and walking.  Pain is interrupting his sleep.  He completed physical therapy and felt that the therapy actually increased his pain.  He is managing his pain with gabapentin 300 mg at bedtime tolerating without adverse effects.  He is unsure how much relief this medication is providing. At this time recommended that the patient continue his gabapentin and that we slowly increase this dose to 600 mg at bedtime.  Advised patient to watch for daytime drowsiness and decrease his dose back to 300 mg at bedtime if he experiences drowsiness.  As far as lumbar radicular symptoms, due to failure of conservative treatment including limited relief with recent injection and failure of physical therapy/medications, it  is reasonable to proceed with updated imaging in the form of a lumbar MRI.  Further recommendations based on results of lumbar MRI.  Plan reviewed with patient at today's visit.    - Considering patient has worsening symptoms of lumbar radiculopathy and failed conservative treatment including physical therapy, injections and medications; we will send for MRI lumbar spine.  Further treatment recommendations based on results of MRI.  -Increase gabapentin to 600 mg at bedtime.  Advised patient to watch for any increase in daytime drowsiness. The patient was counseled on the risks and potential side effects of gabapentin as well as its importance for dosage titration. Side effects included but were not limited to, drowsiness, sedation, cognitive decline, peripheral edema, weight gain, seizures, with abrupt withdrawal.  Patient was instructed to call the office with any concerns or side effects before abruptly stopping medication.   - Continue with meloxicam and Tylenol as needed.  -Patient will follow-up approximately 3 to 4 weeks after his MRI for reevaluation and further treatment recommendations.

## 2025-03-18 NOTE — PROGRESS NOTES
Subjective   Patient ID: Kamille Cotto Sr. is a 59 y.o. male who presents for lumbar radicular pain    HPI  59-year-old male referred by orthopedic/spine surgery for consideration of epidural steroid injections.  History of low back pain for many years with recent worsening symptoms over the last few months.  Lumbar x-ray consistent with degenerative changes at L3-4, L4-5 and L5-S1; fairly significant grade 2 anterolisthesis of L5 on S1.  He has done physical therapy multiple times in the past and was continuing in physical therapy at his last office visit.  Maintained on gabapentin 300 mg at bedtime.  Hesitant to increase dose secondary to drowsy effect and the fact that the patient is a  during the day.  Due to patient's symptoms consistent with lower extremity neuritis and possible claudication, he was scheduled for an interlaminar lumbar epidural steroid injection at L4-5 due to significant listhesis at L5-S1.  Presenting at today's office visit for follow-up after proceeding with his epidural steroid injection.  Patient states that he received moderate relief after approximately 10 days with the lumbar epidural steroid injection at L4-5.  He endorses approximately 50% relief.  States that pain relief has worn off and he currently has minimal relief.  Currently experiencing low back pain radiating posteriorly into bilateral hips and left lower extremity to the level of his foot.  He has intermittent numbness and tingling in his left lower extremity most noted when he stands for even a short period of time.  He has intermittent weakness most noted in his left lower extremity.  He denies any bowel or bladder dysfunction.  States that his pain is aching and throbbing.  Pain increases with standing and walking.  Patient states he cannot stand for longer than 5 to 10 minutes without increasing pain radiating to his left lower extremity.  He completed physical therapy approximately 2 weeks ago and felt  "that it increased his pain.  He currently is tolerating gabapentin 300 mg at bedtime without adverse effects.  Not sure how much relief this medication is providing.  He will supplement with meloxicam as needed.  States he uses this medication most often for widespread polyarticular pain.    Location of Pain: low back pain, mainly on left side, at times on right side. Pain radiates down bilat legs, at times all the way to his foot-depends on if standing too long. Has had pain past few mths on a consistent basis. Pain actually began 2 yrs ago. Denies injuries. Sitting long periods in recliner with legs stretched out. increases pain.  Pt had LESI L5/S1 on 1/31/25 and initially received \"moderate relief that made the pain tolerable and I was walking a little better. I do not think my pain relief is as good as it was a month ago but I do still have some pain relief remaining from the injection.\"  Pt's wife is with him at today's visit and she sts she has noticed the patient, \"is limping and has changed his gait since having the injection, mostly over the past 2 weeks.\"             Pain Score: \"3/10\"    Other pain medication/neuromodulator: meloxicam every day, tylenol-stopped taking until began gabapentin early Dec. 2024-states is helping. 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 Heat-helps some  OA 1/13/25  Oswestry score 35      OARRS:  Virgie Oliva, APRN-CNP, APRN-CNS on 3/18/2025  1:15 PM  I have personally reviewed the OARRS report for Kamille Cotto Sr.. I have considered the risks of abuse, dependence, addiction and diversion        Review of Systems    ROS:   General: No fevers, chills, weight loss  Skin: Negative for lesions  Eyes: No acute vision changes  Ears: No vertigo  Nose, mouth, throat: No difficulty swallowing or speaking  Respiratory: No cough, shortness of breath, " cyanosis  Cardiovascular: Negative for chest pain syncope or palpitation  Gastrointestinal: No constipation, nausea, vomiting  Neurological: Negative for headache, positive for: Intermittent paresthesia and weakness left lower extremity  Psychological: Negative for severe or debilitating anxiety, depression. Negative memory loss  Musculoskeletal: Positive for arthralgia, myalgia and pain  Endocrine: Negative for weight gain, appetite changes, excessive sweating  Allergy/immune: Negative    All 13 systems were reviewed and are within normal levels except as noted or in the history of present illness.  Positive or pertinent negative responses are noted or were in the history of present illness. As noted, the patient denies significant or impairing weakness in the bilateral upper and lower extremities, medication induced constipation, and bowel or bladder incontinence.     Current Outpatient Medications:     ascorbic acid (Vitamin C) 500 mg ER capsule, Take 1 capsule (500 mg) by mouth once daily., Disp: , Rfl:     atorvastatin (Lipitor) 10 mg tablet, Take 1 tablet (10 mg) by mouth once daily., Disp: 90 tablet, Rfl: 1    diclofenac sodium (Voltaren) 1 % gel, Apply 2.25 inches (2 g) topically 4 times a day as needed (for pain)., Disp: 200 g, Rfl: 1    dilTIAZem XR (DILT-XR) 180 mg 24 hr capsule, Take 1 capsule (180 mg) by mouth once daily., Disp: 90 capsule, Rfl: 1    glucosamine-chondroitin 500-400 mg tablet, Take 1 tablet by mouth every other day., Disp: , Rfl:     lansoprazole (Prevacid) 30 mg DR capsule, Take 1 capsule (30 mg) by mouth once daily in the morning. Take before meals., Disp: 90 capsule, Rfl: 1    lisinopril 5 mg tablet, Take 1 tablet (5 mg) by mouth once daily., Disp: 30 tablet, Rfl: 3    meloxicam (Mobic) 15 mg tablet, TAKE 1 TABLET BY MOUTH ONCE  DAILY AS NEEDED FOR MODERATE  PAIN (SCALE 4-6), Disp: 90 tablet, Rfl: 0    multivitamin tablet, Take by mouth., Disp: , Rfl:     pyridoxine (B-6) 250 mg  tablet, Take 50 mg by mouth once daily., Disp: , Rfl:     sertraline (Zoloft) 50 mg tablet, Take 1 tablet (50 mg) by mouth once daily., Disp: 90 tablet, Rfl: 1    turmeric root extract 500 mg tablet, Take by mouth., Disp: , Rfl:     gabapentin (Neurontin) 300 mg capsule, Take 2 capsules (600 mg) by mouth once daily at bedtime., Disp: 60 capsule, Rfl: 2  No current facility-administered medications for this visit.    Facility-Administered Medications Ordered in Other Visits:     atropine syringe 0.25-5 mg, 0.25-5 mg, intravenous, Once PRN, Ricardo Ramírez MD, 0.5 mg at 03/18/25 1139    DOBUTamine (Dobutrex) 250 mg in dextrose 5% 250 mL (1 mg/mL) infusion (premix), 5-40 mcg/kg/min, intravenous, Continuous, Ricrado Ramírez MD, Last Rate: 189 mL/hr at 03/18/25 1140, 30 mcg/kg/min at 03/18/25 1140     Past Medical History:   Diagnosis Date    Anxiety disorder, unspecified     Anxiety    Other specified abnormal findings of blood chemistry 07/20/2020    Low testosterone    Personal history of other diseases of the circulatory system     History of paroxysmal supraventricular tachycardia    Personal history of other specified conditions     History of palpitations        Past Surgical History:   Procedure Laterality Date    OTHER SURGICAL HISTORY  09/23/2021    Cholecystectomy        Family History   Problem Relation Name Age of Onset    Breast cancer Mother      Heart attack Father          Allergies   Allergen Reactions    Bacitracin Swelling and Unknown    Morphine Unknown    Penicillins Diarrhea and Unknown        Objective     Visit Vitals  /85 (BP Location: Left arm)   Pulse 76   Resp 16   Ht 1.829 m (6')   Wt 106 kg (233 lb 3.2 oz)   SpO2 97%   BMI 31.63 kg/m²   Smoking Status Former   BSA 2.32 m²        Physical Exam    PE:  General: Well-developed, well-nourished, no acute distress. The patient demonstrates no pain behavior, symptom magnification or overt drug-seeking behavior.  Eye: Pupils appropriate for  room lighting  Neck/thyroid: No obvious goiter or enlargement of neck noted  Respiratory exam: Normal respiratory effort, unlabored respiration. No accessory muscle use noted  Cardiac exam: Bilateral radial pulses intact  Abdominal: Nondistended  Spine, lumbar: The patient is able to rise from a seated to standing position without hesitancy, push off, or delay. Gait is slow, deliberate and slightly limping favoring left lower extremity.  Tenderness to paraspinous musculature left lower lumbar region.  Flexion and extension intact.  Positive straight leg raise left lower extremity.    Neurologic exam: Muscle strength is antigravity in all 4 extremities.  Equal muscle strength bilateral lower extremities 5/5.  Psychiatric exam: Judgment and insight normal, affect normal, speech is fluent, affect appropriate, demonstrating no signs of hypersomnolence, sedation, or confusion            Assessment/Plan   Problem List Items Addressed This Visit             ICD-10-CM    Lumbar radiculopathy - Primary M54.16     59-year-old male presents for follow-up of lower back pain with left lower extremity radicular symptoms in roughly an L5/S1 distribution.  Previous x-ray of his lumbar spine consistent with degenerative changes at L3-4, L4-5 and L5-S1;L5-S1 he also has some fairly significant grade 2 anterolisthesis of L5 on S1.  At that time patient had failed conservative treatment and was scheduled for an interlaminar lumbar epidural steroid injection at L4-5.  Given the amount of listhesis at L5-S1 Dr. Serrano chose to go to the level above and allow the medication to flow down.  Patient also started on gabapentin 300 mg at bedtime.  Continued meloxicam for polyarticular symptoms.  He was continuing physical therapy.  Presenting at today's office visit for follow-up after his recent injection.  The patient states that he received approximately 50% relief after about 10 days.  Relief lasted several weeks and has worn off.  He  feels that he is receiving minimal relief from this injection at this time.  Continues to experience lumbar radicular symptoms radiating most often to his left lower extremity accompanied by neuropathic pain and intermittent weakness.  Denies any bowel/bladder changes.  Pain most intense when he is standing and walking.  Pain is interrupting his sleep.  He completed physical therapy and felt that the therapy actually increased his pain.  He is managing his pain with gabapentin 300 mg at bedtime tolerating without adverse effects.  He is unsure how much relief this medication is providing. At this time recommended that the patient continue his gabapentin and that we slowly increase this dose to 600 mg at bedtime.  Advised patient to watch for daytime drowsiness and decrease his dose back to 300 mg at bedtime if he experiences drowsiness.  As far as lumbar radicular symptoms, due to failure of conservative treatment including limited relief with recent injection and failure of physical therapy/medications, it is reasonable to proceed with updated imaging in the form of a lumbar MRI.  Further recommendations based on results of lumbar MRI.  Plan reviewed with patient at today's visit.    - Considering patient has worsening symptoms of lumbar radiculopathy and failed conservative treatment including physical therapy, injections and medications; we will send for MRI lumbar spine.  Further treatment recommendations based on results of MRI.  -Increase gabapentin to 600 mg at bedtime.  Advised patient to watch for any increase in daytime drowsiness. The patient was counseled on the risks and potential side effects of gabapentin as well as its importance for dosage titration. Side effects included but were not limited to, drowsiness, sedation, cognitive decline, peripheral edema, weight gain, seizures, with abrupt withdrawal.  Patient was instructed to call the office with any concerns or side effects before abruptly  stopping medication.   - Continue with meloxicam and Tylenol as needed.  -Patient will follow-up approximately 3 to 4 weeks after his MRI for reevaluation and further treatment recommendations.         Relevant Medications    gabapentin (Neurontin) 300 mg capsule    Other Relevant Orders    MR lumbar spine wo IV contrast

## 2025-04-04 ENCOUNTER — HOSPITAL ENCOUNTER (OUTPATIENT)
Dept: RADIOLOGY | Facility: HOSPITAL | Age: 60
Discharge: HOME | End: 2025-04-04
Payer: COMMERCIAL

## 2025-04-04 DIAGNOSIS — M54.16 LUMBAR RADICULOPATHY: ICD-10-CM

## 2025-04-04 PROCEDURE — 72148 MRI LUMBAR SPINE W/O DYE: CPT

## 2025-04-23 ENCOUNTER — APPOINTMENT (OUTPATIENT)
Dept: PAIN MEDICINE | Facility: HOSPITAL | Age: 60
End: 2025-04-23
Payer: COMMERCIAL

## 2025-04-23 VITALS
HEART RATE: 76 BPM | BODY MASS INDEX: 31.56 KG/M2 | RESPIRATION RATE: 16 BRPM | SYSTOLIC BLOOD PRESSURE: 117 MMHG | WEIGHT: 233 LBS | OXYGEN SATURATION: 96 % | HEIGHT: 72 IN | DIASTOLIC BLOOD PRESSURE: 80 MMHG

## 2025-04-23 DIAGNOSIS — M54.16 LUMBAR RADICULOPATHY: Primary | ICD-10-CM

## 2025-04-23 PROCEDURE — 99214 OFFICE O/P EST MOD 30 MIN: CPT | Performed by: CLINICAL NURSE SPECIALIST

## 2025-04-23 PROCEDURE — 3008F BODY MASS INDEX DOCD: CPT | Performed by: CLINICAL NURSE SPECIALIST

## 2025-04-23 PROCEDURE — 3079F DIAST BP 80-89 MM HG: CPT | Performed by: CLINICAL NURSE SPECIALIST

## 2025-04-23 PROCEDURE — 3074F SYST BP LT 130 MM HG: CPT | Performed by: CLINICAL NURSE SPECIALIST

## 2025-04-23 RX ORDER — DIAZEPAM 5 MG/1
5 TABLET ORAL ONCE AS NEEDED
OUTPATIENT
Start: 2025-04-23

## 2025-04-23 NOTE — PROGRESS NOTES
Subjective   Patient ID: Kamille Cotto Sr. is a 59 y.o. male who presents for lumbar radicular pain.    HPI    59-year-old male referred by orthopedic spine surgery for consideration of epidural steroid injections.  History of low back pain for many years with recent worsening of symptoms.  Lumbar x-ray consistent with degenerative changes at L3-4, L4-5 and L5-S1; fairly significant grade 2 anterolisthesis at L5 on S1.  He has done physical therapy multiple times in the past with most recent physical therapy increasing his pain.  Patient was sent for an interlaminar lumbar epidural steroid injection which initially was scheduled at L5-S1 and had to be changed to L4-5 secondary to significant listhesis at L5-S1.  Patient received approximately 50% relief for 10 days with this injection.  She is his pain with gabapentin which we increased to 600 mg at bedtime for additional neuropathic relief, meloxicam and Tylenol.  Presenting at today's office visit for follow-up.  Currently patient is experiencing low back pain radiating posteriorly into bilateral hips and into his left lower extremity.  He will intermittently feel pain radiating into his right lower extremity.  Numbness and tingling in bilateral lower extremities left greater than right.  Denies any weakness or changes in bowel/bladder function.  Pain is described as aching and throbbing.  His pain will increase with prolonged standing.  States he cannot stand for longer than 5 to 10 minutes without increasing pain radiating most often to his left lower extremity.  He does feel that the increase in gabapentin dose has been helpful and states that he has been able to sleep better secondary to improvement in nighttime pain.  Continued benefit with meloxicam and Tylenol.    Location of Pain: Patient is here for low back pain that radiates into bilateral legs into the feet. Worse on the left side. States he really does not feel it on the right side. Standing  "increases pain.     Pain Score: \"0/10\"  8/10- pain at worst     Other pain medication/neuromodulator: Meloxicam /Gabapentin    Therapeutic Goals: resume nl activity    Injections and/or Procedures:   Other: PT-failed  OA 1/13/25  Oswestry score 35  OARRS:  Virgie lOiva, APRN-CNP, APRN-CNS on 4/23/2025  3:41 PM  I have personally reviewed the OARRS report for Kamille Cotto Sr.. I have considered the risks of abuse, dependence, addiction and diversion        Review of Systems    ROS:   General: No fevers, chills, weight loss  Skin: Negative for lesions  Eyes: No acute vision changes  Ears: No vertigo  Nose, mouth, throat: No difficulty swallowing or speaking  Respiratory: No cough, shortness of breath, cyanosis  Cardiovascular: Negative for chest pain syncope or palpitation  Gastrointestinal: No constipation, nausea, vomiting  Neurological: Negative for headache, positive for: Paresthesia  Psychological: Negative for severe or debilitating anxiety, depression. Negative memory loss  Musculoskeletal: Positive for arthralgia, myalgia and pain  Endocrine: Negative for weight gain, appetite changes, excessive sweating  Allergy/immune: Negative    All 13 systems were reviewed and are within normal levels except as noted or in the history of present illness.  Positive or pertinent negative responses are noted or were in the history of present illness. As noted, the patient denies significant or impairing weakness in the bilateral upper and lower extremities, medication induced constipation, and bowel or bladder incontinence.   Current Medications[1]     Medical History[2]     Surgical History[3]     Family History[4]     RX Allergies[5]     Objective     Visit Vitals  /80   Pulse 76   Resp 16   Ht 1.829 m (6')   Wt 106 kg (233 lb)   SpO2 96%   BMI 31.60 kg/m²   Smoking Status Former   BSA 2.32 m²        Physical Exam    PE:  General: Well-developed, well-nourished, no acute distress. The patient demonstrates no pain " behavior, symptom magnification or overt drug-seeking behavior.  Eye: Pupils appropriate for room lighting  Neck/thyroid: No obvious goiter or enlargement of neck noted  Respiratory exam: Normal respiratory effort, unlabored respiration. No accessory muscle use noted  Cardiac exam: Bilateral radial pulses intact  Abdominal: Nondistended  Spine, lumbar: The patient is able to rise from a seated to standing position without hesitancy, push off, or delay. Gait is grossly nonantalgic. Tenderness to paraspinous musculature is noted lower lumbar region left greater than right.  Flexion and extension intact.  Straight leg raise left lower extremity increasing pain into his left thigh.  Neurologic exam: Muscle strength is antigravity in all 4 extremities.  Equal muscle strength bilateral lower extremities 5/5.  Psychiatric exam: Judgment and insight normal, affect normal, speech is fluent, affect appropriate, demonstrating no signs of hypersomnolence, sedation, or confusion          Assessment/Plan   Problem List Items Addressed This Visit           ICD-10-CM    Lumbar radiculopathy - Primary M54.16    59-year-old male presents for follow-up of lower back pain with lower extremity radicular symptoms left greater than right in roughly an L5/S1 distribution.  Previous x-ray of his lumbar spine consistent with degenerative changes at L3-4, L4-5 and L5-S1;L5-S1 he also has some fairly significant grade 2 anterolisthesis of L5 on S1. Previous interlaminar lumbar epidural steroid injection at L4-5 provided approximately 50% relief for 10 days.  Reviewed MRI with patient and Dr. Serrano.  Reviewed patient's current presentation and recent imaging with Dr. Serrano suggesting the patient may benefit from a more targeted injection such as a bilateral transforaminal epidural steroid injection at L4 targeting symptoms of lumbar neuritis.  Reviewed risks and benefits with patient he would like to proceed. Medical necessity: The  patient is presenting primarily with Lumbar pain and radicular symptoms and claudication symptoms.  The pain is constant and of moderate severity that interferes with activities of daily living and sleep. The patient failed conservative therapy to include Tylenol/NSAIDS and physical therapy/supervised home exercise program and continues to participate in their supervised home exercise program.  Lumbar spine MRI which I personally reviewed  with Dr. Serrano showed disc bulge to the left at L3-4; moderate bilateral foraminal narrowing; right worse than left foraminal narrowing at L4-5 without disc herniation; L5-S1 grade 2 spondylolisthesis with disc bulge; severe bilateral foraminal narrowing. The patient has previously undergone a Lumbar Intralaminar Epidural Steroid Injection at L4-5 inadequate pain relief.  Based on these results will plan to switch to a different approach.  Will proceed with a bilateral transforaminal epidural steroid injection at L4 with fluoroscopy.  We discussed the risks, benefits and alternatives to the procedure(s) and the patient would like to proceed.  This procedure is part of a comprehensive and multimodal treatment plan to facility physical therapy and a supervised home exercise program.  As far as medication, patient feels that the increased dose of gabapentin has been beneficial for pain relief as well as allowing him to sleep with less pain.  At this time it is reasonable to continue his gabapentin 600 mg at bedtime.  May consider increasing his dose to 900 mg if needed in the future.  He will continue meloxicam as prescribed by his PCP.  Recommended he supplement with Tylenol keeping total Tylenol dose less than 3000 mg in 24 hours. Plan reviewed with patient at today's visit.    -Schedule patient to proceed with a bilateral transforaminal epidural steroid injection at L4.  Reviewed risks and benefits with patient he would like to proceed.  -Continue gabapentin to 600 mg at  bedtime.  Advised patient to watch for any increase in daytime drowsiness. The patient was counseled on the risks and potential side effects of gabapentin as well as its importance for dosage titration. Side effects included but were not limited to, drowsiness, sedation, cognitive decline, peripheral edema, weight gain, seizures, with abrupt withdrawal.  Patient was instructed to call the office with any concerns or side effects before abruptly stopping medication.  May consider increasing gabapentin dose in the future to 900 mg at bedtime if needed.  - Continue with meloxicam as prescribed by PCP.    - Supplement with Tylenol keeping total Tylenol dose less than 3000 mg in 24 hours.    -Patient will follow-up approximately 6 weeks after his injection for reevaluation.          Relevant Orders    [place] pain management    Transforaminal            [1]   Current Outpatient Medications:     atorvastatin (Lipitor) 10 mg tablet, Take 1 tablet (10 mg) by mouth once daily., Disp: 90 tablet, Rfl: 1    dilTIAZem XR (DILT-XR) 180 mg 24 hr capsule, Take 1 capsule (180 mg) by mouth once daily., Disp: 90 capsule, Rfl: 1    glucosamine-chondroitin 500-400 mg tablet, Take 1 tablet by mouth every other day., Disp: , Rfl:     lansoprazole (Prevacid) 30 mg DR capsule, Take 1 capsule (30 mg) by mouth once daily in the morning. Take before meals., Disp: 90 capsule, Rfl: 1    lisinopril 5 mg tablet, Take 1 tablet (5 mg) by mouth once daily., Disp: 30 tablet, Rfl: 3    meloxicam (Mobic) 15 mg tablet, TAKE 1 TABLET BY MOUTH ONCE  DAILY AS NEEDED FOR MODERATE  PAIN (SCALE 4-6), Disp: 90 tablet, Rfl: 0    multivitamin tablet, Take by mouth., Disp: , Rfl:     pyridoxine (B-6) 250 mg tablet, Take 50 mg by mouth once daily., Disp: , Rfl:     sertraline (Zoloft) 50 mg tablet, Take 1 tablet (50 mg) by mouth once daily., Disp: 90 tablet, Rfl: 1    turmeric root extract 500 mg tablet, Take by mouth., Disp: , Rfl:     ascorbic acid (Vitamin C) 500 mg  ER capsule, Take 1 capsule (500 mg) by mouth once daily., Disp: , Rfl:     diclofenac sodium (Voltaren) 1 % gel, Apply 2.25 inches (2 g) topically 4 times a day as needed (for pain)., Disp: 200 g, Rfl: 1    gabapentin (Neurontin) 300 mg capsule, Take 2 capsules (600 mg) by mouth once daily at bedtime., Disp: 60 capsule, Rfl: 2  [2]   Past Medical History:  Diagnosis Date    Anxiety disorder, unspecified     Anxiety    Other specified abnormal findings of blood chemistry 07/20/2020    Low testosterone    Personal history of other diseases of the circulatory system     History of paroxysmal supraventricular tachycardia    Personal history of other specified conditions     History of palpitations   [3]   Past Surgical History:  Procedure Laterality Date    OTHER SURGICAL HISTORY  09/23/2021    Cholecystectomy   [4]   Family History  Problem Relation Name Age of Onset    Breast cancer Mother      Heart attack Father     [5]   Allergies  Allergen Reactions    Bacitracin Swelling and Unknown    Morphine Unknown    Penicillins Diarrhea and Unknown

## 2025-04-23 NOTE — ASSESSMENT & PLAN NOTE
59-year-old male presents for follow-up of lower back pain with lower extremity radicular symptoms left greater than right in roughly an L5/S1 distribution.  Previous x-ray of his lumbar spine consistent with degenerative changes at L3-4, L4-5 and L5-S1;L5-S1 he also has some fairly significant grade 2 anterolisthesis of L5 on S1. Previous interlaminar lumbar epidural steroid injection at L4-5 provided approximately 50% relief for 10 days.  Reviewed MRI with patient and Dr. Serrano.  Reviewed patient's current presentation and recent imaging with Dr. Serrano suggesting the patient may benefit from a more targeted injection such as a bilateral transforaminal epidural steroid injection at L4 targeting symptoms of lumbar neuritis.  Reviewed risks and benefits with patient he would like to proceed. Medical necessity: The patient is presenting primarily with Lumbar pain and radicular symptoms and claudication symptoms.  The pain is constant and of moderate severity that interferes with activities of daily living and sleep. The patient failed conservative therapy to include Tylenol/NSAIDS and physical therapy/supervised home exercise program and continues to participate in their supervised home exercise program.  Lumbar spine MRI which I personally reviewed  with Dr. Serrano showed disc bulge to the left at L3-4; moderate bilateral foraminal narrowing; right worse than left foraminal narrowing at L4-5 without disc herniation; L5-S1 grade 2 spondylolisthesis with disc bulge; severe bilateral foraminal narrowing. The patient has previously undergone a Lumbar Intralaminar Epidural Steroid Injection at L4-5 inadequate pain relief.  Based on these results will plan to switch to a different approach.  Will proceed with a bilateral transforaminal epidural steroid injection at L4 with fluoroscopy.  We discussed the risks, benefits and alternatives to the procedure(s) and the patient would like to proceed.  This procedure is  part of a comprehensive and multimodal treatment plan to facility physical therapy and a supervised home exercise program.  As far as medication, patient feels that the increased dose of gabapentin has been beneficial for pain relief as well as allowing him to sleep with less pain.  At this time it is reasonable to continue his gabapentin 600 mg at bedtime.  May consider increasing his dose to 900 mg if needed in the future.  He will continue meloxicam as prescribed by his PCP.  Recommended he supplement with Tylenol keeping total Tylenol dose less than 3000 mg in 24 hours. Plan reviewed with patient at today's visit.    -Schedule patient to proceed with a bilateral transforaminal epidural steroid injection at L4.  Reviewed risks and benefits with patient he would like to proceed.  -Continue gabapentin to 600 mg at bedtime.  Advised patient to watch for any increase in daytime drowsiness. The patient was counseled on the risks and potential side effects of gabapentin as well as its importance for dosage titration. Side effects included but were not limited to, drowsiness, sedation, cognitive decline, peripheral edema, weight gain, seizures, with abrupt withdrawal.  Patient was instructed to call the office with any concerns or side effects before abruptly stopping medication.  May consider increasing gabapentin dose in the future to 900 mg at bedtime if needed.  - Continue with meloxicam as prescribed by PCP.    - Supplement with Tylenol keeping total Tylenol dose less than 3000 mg in 24 hours.    -Patient will follow-up approximately 6 weeks after his injection for reevaluation.

## 2025-04-28 DIAGNOSIS — M54.16 LUMBAR RADICULOPATHY: ICD-10-CM

## 2025-04-28 DIAGNOSIS — M54.9 CHRONIC BACK PAIN, UNSPECIFIED BACK LOCATION, UNSPECIFIED BACK PAIN LATERALITY: ICD-10-CM

## 2025-04-28 DIAGNOSIS — E78.2 MIXED HYPERLIPIDEMIA: ICD-10-CM

## 2025-04-28 DIAGNOSIS — G89.29 CHRONIC BACK PAIN, UNSPECIFIED BACK LOCATION, UNSPECIFIED BACK PAIN LATERALITY: ICD-10-CM

## 2025-04-29 DIAGNOSIS — M54.16 LUMBAR RADICULOPATHY: ICD-10-CM

## 2025-04-29 RX ORDER — GABAPENTIN 300 MG/1
600 CAPSULE ORAL NIGHTLY
Qty: 180 CAPSULE | Refills: 3 | OUTPATIENT
Start: 2025-04-29

## 2025-04-30 RX ORDER — GABAPENTIN 300 MG/1
600 CAPSULE ORAL NIGHTLY
Qty: 60 CAPSULE | Refills: 2 | Status: SHIPPED | OUTPATIENT
Start: 2025-04-30 | End: 2025-05-30

## 2025-04-30 RX ORDER — ATORVASTATIN CALCIUM 10 MG/1
10 TABLET, FILM COATED ORAL DAILY
Qty: 90 TABLET | Refills: 0 | Status: SHIPPED | OUTPATIENT
Start: 2025-04-30

## 2025-04-30 RX ORDER — MELOXICAM 15 MG/1
TABLET ORAL
Qty: 90 TABLET | Refills: 0 | Status: SHIPPED | OUTPATIENT
Start: 2025-04-30

## 2025-04-30 NOTE — TELEPHONE ENCOUNTER
Okay to refill gabapentin 600 mg at bedtime.  
Pt is requesting refill of     gabapentin 600 mg po @ [facility] order [facility].                                                    LV:    4/23/25                NV:    7/3/25                                        Pended RX to JAIME Triplett for transmission to pharmacy.   
Detail Level: Generalized

## 2025-05-01 ENCOUNTER — APPOINTMENT (OUTPATIENT)
Dept: PRIMARY CARE | Facility: CLINIC | Age: 60
End: 2025-05-01
Payer: COMMERCIAL

## 2025-05-01 ENCOUNTER — APPOINTMENT (OUTPATIENT)
Dept: PAIN MEDICINE | Facility: HOSPITAL | Age: 60
End: 2025-05-01
Payer: COMMERCIAL

## 2025-05-12 ENCOUNTER — TELEPHONE (OUTPATIENT)
Dept: ORTHOPEDIC SURGERY | Facility: HOSPITAL | Age: 60
End: 2025-05-12
Payer: COMMERCIAL

## 2025-05-12 NOTE — TELEPHONE ENCOUNTER
A voicemail was left with the patient today explaining that his appointment was moved from [place] to [facility] due to construction. A number to reschedule was also provided.

## 2025-05-15 ENCOUNTER — PREP FOR PROCEDURE (OUTPATIENT)
Dept: PAIN MEDICINE | Facility: HOSPITAL | Age: 60
End: 2025-05-15
Payer: COMMERCIAL

## 2025-05-15 DIAGNOSIS — M54.16 LUMBAR RADICULOPATHY: Primary | ICD-10-CM

## 2025-05-15 RX ORDER — DIAZEPAM 5 MG/1
5 TABLET ORAL ONCE AS NEEDED
Status: CANCELLED | OUTPATIENT
Start: 2025-05-15

## 2025-05-16 ENCOUNTER — HOSPITAL ENCOUNTER (OUTPATIENT)
Dept: GASTROENTEROLOGY | Facility: HOSPITAL | Age: 60
Discharge: HOME | End: 2025-05-16
Payer: COMMERCIAL

## 2025-05-16 VITALS
OXYGEN SATURATION: 99 % | DIASTOLIC BLOOD PRESSURE: 78 MMHG | TEMPERATURE: 97 F | RESPIRATION RATE: 16 BRPM | SYSTOLIC BLOOD PRESSURE: 115 MMHG | HEART RATE: 65 BPM

## 2025-05-16 DIAGNOSIS — M54.16 LUMBAR RADICULOPATHY: ICD-10-CM

## 2025-05-16 PROCEDURE — 2500000002 HC RX 250 W HCPCS SELF ADMINISTERED DRUGS (ALT 637 FOR MEDICARE OP, ALT 636 FOR OP/ED): Performed by: CLINICAL NURSE SPECIALIST

## 2025-05-16 PROCEDURE — 2500000004 HC RX 250 GENERAL PHARMACY W/ HCPCS (ALT 636 FOR OP/ED): Performed by: PHYSICAL MEDICINE & REHABILITATION

## 2025-05-16 PROCEDURE — 2550000001 HC RX 255 CONTRASTS: Performed by: PHYSICAL MEDICINE & REHABILITATION

## 2025-05-16 PROCEDURE — 64483 NJX AA&/STRD TFRM EPI L/S 1: CPT | Mod: 50 | Performed by: PHYSICAL MEDICINE & REHABILITATION

## 2025-05-16 RX ORDER — LIDOCAINE HYDROCHLORIDE 5 MG/ML
INJECTION, SOLUTION INFILTRATION; INTRAVENOUS AS NEEDED
Status: COMPLETED | OUTPATIENT
Start: 2025-05-16 | End: 2025-05-16

## 2025-05-16 RX ORDER — DIAZEPAM 5 MG/1
5 TABLET ORAL ONCE AS NEEDED
Status: COMPLETED | OUTPATIENT
Start: 2025-05-16 | End: 2025-05-16

## 2025-05-16 RX ADMIN — IOHEXOL 10 ML: 350 INJECTION, SOLUTION INTRAVENOUS at 12:31

## 2025-05-16 RX ADMIN — DIAZEPAM 5 MG: 5 TABLET ORAL at 11:56

## 2025-05-16 RX ADMIN — LIDOCAINE HYDROCHLORIDE 15 ML: 5 INJECTION, SOLUTION INFILTRATION at 12:27

## 2025-05-16 RX ADMIN — DEXAMETHASONE SODIUM PHOSPHATE 10 MG: 4 INJECTION INTRA-ARTICULAR; INTRALESIONAL; INTRAMUSCULAR; INTRAVENOUS; SOFT TISSUE at 12:32

## 2025-05-16 ASSESSMENT — COLUMBIA-SUICIDE SEVERITY RATING SCALE - C-SSRS
2. HAVE YOU ACTUALLY HAD ANY THOUGHTS OF KILLING YOURSELF?: NO
6. HAVE YOU EVER DONE ANYTHING, STARTED TO DO ANYTHING, OR PREPARED TO DO ANYTHING TO END YOUR LIFE?: NO
1. IN THE PAST MONTH, HAVE YOU WISHED YOU WERE DEAD OR WISHED YOU COULD GO TO SLEEP AND NOT WAKE UP?: NO

## 2025-05-16 ASSESSMENT — PATIENT HEALTH QUESTIONNAIRE - PHQ9
2. FEELING DOWN, DEPRESSED OR HOPELESS: NOT AT ALL
SUM OF ALL RESPONSES TO PHQ9 QUESTIONS 1 AND 2: 0
1. LITTLE INTEREST OR PLEASURE IN DOING THINGS: NOT AT ALL

## 2025-05-16 ASSESSMENT — PAIN - FUNCTIONAL ASSESSMENT
PAIN_FUNCTIONAL_ASSESSMENT: 0-10
PAIN_FUNCTIONAL_ASSESSMENT: 0-10

## 2025-05-16 ASSESSMENT — PAIN SCALES - GENERAL
PAINLEVEL_OUTOF10: 0 - NO PAIN
PAINLEVEL_OUTOF10: 4

## 2025-05-16 NOTE — DISCHARGE INSTRUCTIONS
DISCHARGE INSTRUCTIONS FOR INJECTIONS     After most injections, it is recommended that you relax and limit your activity for the remainder of the day unless you have been told otherwise by your pain physician.  You should not drive a car, operate machinery, or make important legal decisions unless otherwise directed by your pain physician.  You may resume your normal activity, including exercise, tomorrow.      Keep a written pain diary of how much pain relief you experienced following the injection procedure and the length of time of pain relief you experienced pain relief. Following diagnostic injections like medial branch nerve blocks, genicular nerve blocks, sacroiliac joint blocks, stellate ganglion injections and other blocks, it is very important you record the specific amount of pain relief you experienced immediately after the injection and how long it lasted. If you have been given Questionnaire paperwork to fill out out after your diagnostic block please call [phone] and leave a detailed voicemail with the answers to the questions you were given. This information is vital to getting approval for next steps.    Observe the needle site for excessive bleeding (slow general oozing that completely soaks the dressing or fresh bright red bleeding).  In either case, apply pressure to the area, elevate it if possible and call your doctor at once.      For all injections, please keep the injection site dry and inspect the site for a couple of days. You may remove the Band-Aid the day of the injection at any time.     Keep the needle site clean & dry for 24 hours.   no soaking baths, hot tubs, whirlpools or swimming pools for 2-3 days.     Some discomfort, bruising or slight swelling may occur at the injection site. This is not abnormal if it occurs.  If needed you may:    -Take over the counter medication such as Tylenol or Motrin.   -Apply an ice pack for 30 minutes, 2 to 3 times a day for the first 24  hours.     If you are given steroids in your injection, your pain may not be gone immediately after this procedure. It generally takes 3-5 days for the steroid to work but it can take up to 2 weeks. may You may notice a worsening of your symptoms for 1-2 days after the injection. This is not abnormal.  You may use acetaminophen, ibuprofen, or prescription medication that your doctor may have prescribed for you if you need to do so.     A few common side effects of steroids include facial flushing, sweating, restlessness, irritability,difficulty sleeping, increase in blood sugar, and increased blood pressure. If you have diabetes, please monitor your blood sugar at least once a day for at least 5 days. If you have poorly controlled high blood pressure, monitoryour blood pressure for at least 2 days and contact your primary care physician if these numbers are unusually high for you.        Call  Pain Management at [phone] between 8am-4pm Monday - Friday if you are experiencing the following:    If you received an epidural or spinal injection:    -Headache that does not go away with medicine, is worse when sitting or standing up, and is greatly relieved upon lying down.   -Severe pain worse than or different than your baseline pain.   -Chills or fever (101º F or greater).   -Drainage or signs of infection at the injection site     Go directly to the Emergency Department if you are experiencing the following and received an epidural or spinal injection:   -Abrupt weakness or progressive weakness in your legs that starts after you leave the clinic.   -Abrupt severe or worsening numbness in your legs.   -Inability to urinate after the injection or loss of bowel or bladder control without the urge to defecate or urinate.     If you have a clinical question that cannot wait until your next appointment, please call [phone] between 8am-4pm Monday - Friday. We do our best to return all non-emergency messages within  24-48 hours, Monday - Friday. A nurse or physician will return your message. You may also try calling and they will do their best to answer your question(s):    Rashida Serrano's nurse [phone]  [facility] Pain Management nurse [phone]  After hours pain management [phone]    If you need to cancel an appointment, please call the scheduling staff at [phone] during normal business hours or leave a message at least 24 hours in advance.

## 2025-05-19 ENCOUNTER — OFFICE VISIT (OUTPATIENT)
Dept: ORTHOPEDIC SURGERY | Facility: CLINIC | Age: 60
End: 2025-05-19
Payer: COMMERCIAL

## 2025-05-19 ENCOUNTER — APPOINTMENT (OUTPATIENT)
Dept: PRIMARY CARE | Facility: CLINIC | Age: 60
End: 2025-05-19
Payer: COMMERCIAL

## 2025-05-19 DIAGNOSIS — M43.16 SPONDYLOLISTHESIS OF LUMBAR REGION: Primary | ICD-10-CM

## 2025-05-19 DIAGNOSIS — M54.16 LUMBAR RADICULOPATHY: ICD-10-CM

## 2025-05-19 PROCEDURE — 99214 OFFICE O/P EST MOD 30 MIN: CPT

## 2025-05-19 NOTE — PROGRESS NOTES
HPI:  Kamille Cotto Sr. is a 59-year-old male who presents today for follow-up appointment and lumbar MRI review.  He has been dealing with a few year history of low back and left-sided radicular pain, mainly into the buttocks.  He has tried physical therapy and recently increased his gabapentin and had an injection through pain management.  This helped significantly.  Denies symptoms in the right leg.  No weakness in the lower extremities.  No other questions or concerns at time of visit.    ROS:  Reviewed on EMR and patient intake sheet.    PMH/SH:  Reviewed on EMR and patient intake sheet.    Exam:  MSK: Full strength and range of motion of lower extremities bilaterally.  Negative straight leg raise bilaterally.  General: No acute distress. Awake and conversant.  Eyes: Normal conjunctiva, anicteric. Round symmetric pupils.  ENT: Hearing grossly intact. No nasal discharge.  Neck: Neck is supple. No masses or thyromegaly.  Respiratory: Respirations are non-labored. No wheezing.  Skin: Warm. No rashes or ulcers.  Psych: Alert and oriented. Cooperative, appropriate mood and affect, normal judgement.  CV: No lower extremity edema.  Neuro: Sensation and CN II-XII grossly normal.    Radiology:     MRI of lumbar spine personally reviewed with patient and demonstrates grade 2 spondylolisthesis at L5/S1 with severe bilateral foraminal narrowing.  Multilevel disc degeneration and bulging throughout with varying levels of foraminal stenosis.    Diagnosis:    Lumbar spondylolisthesis  Lumbar radiculopathy    Assessment and Plan:  Patient was seen today and evaluated for MRI review of lumbar spine and lumbar radicular symptoms on the left.  At this time, I recommended continuing with gabapentin and pain management injections as these have been helping.  If at any point his symptoms become intolerable, or injections/pharmacologic management stops working, then he may follow-up for surgical consultation.  Patient feels all  questions were answered today.  Patient agrees to the plan above.    **This note was dictated using speech recognition software and was not corrected for spelling or grammatical errors**    Nick Mitchell PA-C  Department of Orthopaedic Surgery  12:29 PM  05/19/25    [id number] [place] [address]    Voicemail: [phone]   Appts: [phone]  Fax: [phone]

## 2025-06-05 ENCOUNTER — APPOINTMENT (OUTPATIENT)
Dept: PRIMARY CARE | Facility: CLINIC | Age: 60
End: 2025-06-05
Payer: COMMERCIAL

## 2025-06-05 VITALS
DIASTOLIC BLOOD PRESSURE: 95 MMHG | WEIGHT: 233 LBS | OXYGEN SATURATION: 95 % | HEART RATE: 89 BPM | BODY MASS INDEX: 31.56 KG/M2 | SYSTOLIC BLOOD PRESSURE: 144 MMHG | RESPIRATION RATE: 16 BRPM | TEMPERATURE: 96.8 F | HEIGHT: 72 IN

## 2025-06-05 DIAGNOSIS — F41.9 ANXIETY: ICD-10-CM

## 2025-06-05 DIAGNOSIS — R73.03 PREDIABETES: ICD-10-CM

## 2025-06-05 DIAGNOSIS — Z12.5 PROSTATE CANCER SCREENING: ICD-10-CM

## 2025-06-05 DIAGNOSIS — I10 PRIMARY HYPERTENSION: Primary | ICD-10-CM

## 2025-06-05 DIAGNOSIS — E78.2 MIXED HYPERLIPIDEMIA: ICD-10-CM

## 2025-06-05 DIAGNOSIS — M54.9 CHRONIC BACK PAIN, UNSPECIFIED BACK LOCATION, UNSPECIFIED BACK PAIN LATERALITY: ICD-10-CM

## 2025-06-05 DIAGNOSIS — K21.9 GASTROESOPHAGEAL REFLUX DISEASE, UNSPECIFIED WHETHER ESOPHAGITIS PRESENT: ICD-10-CM

## 2025-06-05 DIAGNOSIS — G89.29 CHRONIC BACK PAIN, UNSPECIFIED BACK LOCATION, UNSPECIFIED BACK PAIN LATERALITY: ICD-10-CM

## 2025-06-05 DIAGNOSIS — Z12.11 COLON CANCER SCREENING: ICD-10-CM

## 2025-06-05 PROBLEM — H61.23 BILATERAL IMPACTED CERUMEN: Status: RESOLVED | Noted: 2023-04-28 | Resolved: 2025-06-05

## 2025-06-05 PROCEDURE — 3080F DIAST BP >= 90 MM HG: CPT | Performed by: STUDENT IN AN ORGANIZED HEALTH CARE EDUCATION/TRAINING PROGRAM

## 2025-06-05 PROCEDURE — 3075F SYST BP GE 130 - 139MM HG: CPT | Performed by: STUDENT IN AN ORGANIZED HEALTH CARE EDUCATION/TRAINING PROGRAM

## 2025-06-05 PROCEDURE — 99214 OFFICE O/P EST MOD 30 MIN: CPT | Performed by: STUDENT IN AN ORGANIZED HEALTH CARE EDUCATION/TRAINING PROGRAM

## 2025-06-05 PROCEDURE — 3008F BODY MASS INDEX DOCD: CPT | Performed by: STUDENT IN AN ORGANIZED HEALTH CARE EDUCATION/TRAINING PROGRAM

## 2025-06-05 RX ORDER — LANSOPRAZOLE 30 MG/1
30 CAPSULE, DELAYED RELEASE ORAL
Qty: 90 CAPSULE | Refills: 1 | Status: CANCELLED | OUTPATIENT
Start: 2025-06-05

## 2025-06-05 RX ORDER — SERTRALINE HYDROCHLORIDE 50 MG/1
50 TABLET, FILM COATED ORAL DAILY
Qty: 90 TABLET | Refills: 1 | Status: SHIPPED | OUTPATIENT
Start: 2025-06-05

## 2025-06-05 RX ORDER — DILTIAZEM HYDROCHLORIDE 180 MG/1
180 CAPSULE, EXTENDED RELEASE ORAL DAILY
Qty: 90 CAPSULE | Refills: 1 | Status: SHIPPED | OUTPATIENT
Start: 2025-06-05

## 2025-06-05 RX ORDER — ATORVASTATIN CALCIUM 10 MG/1
10 TABLET, FILM COATED ORAL DAILY
Qty: 90 TABLET | Refills: 1 | Status: SHIPPED | OUTPATIENT
Start: 2025-06-05

## 2025-06-05 RX ORDER — MELOXICAM 15 MG/1
TABLET ORAL
Qty: 90 TABLET | Refills: 1 | Status: SHIPPED | OUTPATIENT
Start: 2025-06-05

## 2025-06-05 RX ORDER — LISINOPRIL 5 MG/1
5 TABLET ORAL DAILY
Qty: 90 TABLET | Refills: 1 | Status: SHIPPED | OUTPATIENT
Start: 2025-06-05 | End: 2025-12-02

## 2025-06-05 SDOH — ECONOMIC STABILITY: FOOD INSECURITY: WITHIN THE PAST 12 MONTHS, THE FOOD YOU BOUGHT JUST DIDN'T LAST AND YOU DIDN'T HAVE MONEY TO GET MORE.: NEVER TRUE

## 2025-06-05 SDOH — ECONOMIC STABILITY: FOOD INSECURITY: WITHIN THE PAST 12 MONTHS, YOU WORRIED THAT YOUR FOOD WOULD RUN OUT BEFORE YOU GOT MONEY TO BUY MORE.: NEVER TRUE

## 2025-06-05 ASSESSMENT — ENCOUNTER SYMPTOMS
COLOR CHANGE: 0
VOMITING: 0
NAUSEA: 0
CONFUSION: 0
SHORTNESS OF BREATH: 0
ABDOMINAL PAIN: 0
DIZZINESS: 0
DIARRHEA: 0
HEADACHES: 0
WHEEZING: 0
CONSTIPATION: 0
PALPITATIONS: 0
CHILLS: 0
FEVER: 0
MUSCULOSKELETAL NEGATIVE: 1
COUGH: 0
UNEXPECTED WEIGHT CHANGE: 0
FATIGUE: 0

## 2025-06-05 ASSESSMENT — LIFESTYLE VARIABLES
AUDIT-C TOTAL SCORE: 1
HOW OFTEN DO YOU HAVE SIX OR MORE DRINKS ON ONE OCCASION: NEVER
SKIP TO QUESTIONS 9-10: 1
HOW MANY STANDARD DRINKS CONTAINING ALCOHOL DO YOU HAVE ON A TYPICAL DAY: 1 OR 2
HOW OFTEN DO YOU HAVE A DRINK CONTAINING ALCOHOL: MONTHLY OR LESS

## 2025-06-05 NOTE — PROGRESS NOTES
Subjective   Patient ID: Kamille Cotto Sr. is a 59 y.o. male who presents for Follow-up (3 month follow up for HTN. Pt needs a cologard order).    HPI   He is here for follow up.  Reported he is doing well, no acute illness.  Reports he is due for Cologuard, request an order.    # HTN/HLD   - io /91 and repeat was 144/95  - taking lisinopril 5 mg daily, diltz 180 mg daily   - taking atorva 10 kg daily   - he chew tobacco but doesn't smoke cigs     # Depression with anxiety   - reports stable mood   - taking sertraline 50 mg daily   - no SI/HI and panic attacks     Reports chronic problems are stable as listed below and taking all medications as prescribed w/o issues, request refills.     Review of Systems   Constitutional:  Negative for chills, fatigue, fever and unexpected weight change.   HENT: Negative.     Respiratory:  Negative for cough, shortness of breath and wheezing.    Cardiovascular:  Negative for chest pain, palpitations and leg swelling.   Gastrointestinal:  Negative for abdominal pain, constipation, diarrhea, nausea and vomiting.   Musculoskeletal: Negative.    Skin:  Negative for color change and rash.   Neurological:  Negative for dizziness and headaches.   Psychiatric/Behavioral:  Negative for behavioral problems and confusion.        Objective   BP (!) 144/95 (BP Location: Right arm, Patient Position: Sitting, BP Cuff Size: Large adult)   Pulse 89   Temp 36 °C (96.8 °F) (Temporal)   Resp 16   Ht 1.829 m (6')   Wt 106 kg (233 lb)   SpO2 95%   BMI 31.60 kg/m²     Physical Exam  Vitals and nursing note reviewed.   Constitutional:       Appearance: Normal appearance. He is obese.   Cardiovascular:      Rate and Rhythm: Normal rate and regular rhythm.      Pulses: Normal pulses.      Heart sounds: Normal heart sounds.   Pulmonary:      Effort: Pulmonary effort is normal.      Breath sounds: Normal breath sounds.   Abdominal:      General: Abdomen is flat. Bowel sounds are normal.       Palpations: Abdomen is soft.   Musculoskeletal:         General: Normal range of motion.   Neurological:      General: No focal deficit present.      Mental Status: He is alert.   Psychiatric:         Mood and Affect: Mood normal.         Behavior: Behavior normal.         Assessment/Plan   He is here for follow up.  Appears he is doing okay, no major concerns today and is clinically & vitally stable. Cologuard ordered as requested.  Plan as follows      # HTN/HLD   - BP remains elevated; cont same for now; bring in 2 wks for BP check   - Continue lisinopril 5 mg daily and diltz 180 mg daily as usual  - cont atorva 10 kg daily   - encourage heart healthy & DASH diet; continue exercise as tolerated, at least 150 mins per wk   - BW per emr, will get prior to NOV; pt made aware     # Depression with anxiety   - mood has been stable   - cont sertraline 50 mg daily as usual   - Continue following home relaxation & mindfulness activities daily as deep breathing exercises, meditations, reading books, playing music, etc.     Other chronic problems are stable as listed below; continue all medications as usual. Rx refilled.      Problem List Items Addressed This Visit           ICD-10-CM    Esophageal reflux K21.9    Prediabetes R73.03    Relevant Orders    Hemoglobin A1c     Other Visit Diagnoses         Codes      Primary hypertension    -  Primary I10    Relevant Medications    dilTIAZem XR (DILT-XR) 180 mg 24 hr capsule    lisinopril 5 mg tablet    Other Relevant Orders    Comprehensive Metabolic Panel    CBC      Mixed hyperlipidemia     E78.2    Relevant Medications    atorvastatin (Lipitor) 10 mg tablet    Other Relevant Orders    Lipid Panel      Chronic back pain, unspecified back location, unspecified back pain laterality     M54.9, G89.29    Relevant Medications    meloxicam (Mobic) 15 mg tablet      Anxiety     F41.9    Relevant Medications    sertraline (Zoloft) 50 mg tablet      Prostate cancer screening      Z12.5    Relevant Orders    Prostate Spec.Ag,Screen      Colon cancer screening     Z12.11    Relevant Orders    Cologuard® colon cancer screening          Rtc 4-6 mo for FU/HM     This note was partially generated using the Dragon Voice recognition system. There may be some incorrect wording, spelling and/or spelling errors or punctuation errors that were not corrected prior to committing the note to the medical record.      Ricardo Ramírez MD   [facility], Family Medicine

## 2025-06-19 ENCOUNTER — APPOINTMENT (OUTPATIENT)
Dept: PRIMARY CARE | Facility: CLINIC | Age: 60
End: 2025-06-19
Payer: COMMERCIAL

## 2025-07-03 ENCOUNTER — APPOINTMENT (OUTPATIENT)
Dept: PAIN MEDICINE | Facility: HOSPITAL | Age: 60
End: 2025-07-03
Payer: COMMERCIAL

## 2025-07-07 ENCOUNTER — APPOINTMENT (OUTPATIENT)
Dept: PAIN MEDICINE | Facility: HOSPITAL | Age: 60
End: 2025-07-07
Payer: COMMERCIAL

## 2025-07-07 LAB — NONINV COLON CA DNA+OCC BLD SCRN STL QL: NEGATIVE

## 2025-07-15 ENCOUNTER — APPOINTMENT (OUTPATIENT)
Dept: PAIN MEDICINE | Facility: HOSPITAL | Age: 60
End: 2025-07-15
Payer: COMMERCIAL

## 2025-08-02 DIAGNOSIS — M54.16 LUMBAR RADICULOPATHY: ICD-10-CM

## 2025-08-05 ENCOUNTER — DOCUMENTATION (OUTPATIENT)
Dept: PHYSICAL THERAPY | Facility: CLINIC | Age: 60
End: 2025-08-05
Payer: COMMERCIAL

## 2025-08-05 RX ORDER — GABAPENTIN 300 MG/1
600 CAPSULE ORAL NIGHTLY
Qty: 180 CAPSULE | Refills: 3 | OUTPATIENT
Start: 2025-08-05

## 2025-08-05 NOTE — PROGRESS NOTES
Physical Therapy    Discharge Summary    Name: Kamille Cotto Sr.  MRN: [id number]  : 1965  Date: 25    Discharge Summary: PT    Discharge Information: Date of discharge 25, Date of last visit 25, Date of evaluation 24, Number of attended visits 6, Referred by Nick Mitchell, and Referred for lumbar rad        Discharge Status: at his last session he reported 4-5/10 pain after having to shovel snow and be on his feet a lot at work.  It was located in the L LB and glut.  He amb slowly with the lumbar spine in slight flexion.   TA = strong.    He did not return to PT and is discharged at this time.    Rehab Discharge Reason: Failed to schedule and/or keep follow-up appointment(s)

## 2025-08-12 ENCOUNTER — APPOINTMENT (OUTPATIENT)
Dept: PAIN MEDICINE | Facility: HOSPITAL | Age: 60
End: 2025-08-12
Payer: COMMERCIAL

## 2025-08-12 VITALS
HEART RATE: 78 BPM | DIASTOLIC BLOOD PRESSURE: 81 MMHG | RESPIRATION RATE: 16 BRPM | SYSTOLIC BLOOD PRESSURE: 126 MMHG | BODY MASS INDEX: 31.56 KG/M2 | WEIGHT: 233 LBS | OXYGEN SATURATION: 96 % | HEIGHT: 72 IN

## 2025-08-12 DIAGNOSIS — M54.16 LUMBAR RADICULOPATHY: Primary | ICD-10-CM

## 2025-08-12 PROCEDURE — 3008F BODY MASS INDEX DOCD: CPT | Performed by: CLINICAL NURSE SPECIALIST

## 2025-08-12 PROCEDURE — 99214 OFFICE O/P EST MOD 30 MIN: CPT | Performed by: CLINICAL NURSE SPECIALIST

## 2025-08-12 PROCEDURE — 3074F SYST BP LT 130 MM HG: CPT | Performed by: CLINICAL NURSE SPECIALIST

## 2025-08-12 PROCEDURE — 99212 OFFICE O/P EST SF 10 MIN: CPT | Performed by: CLINICAL NURSE SPECIALIST

## 2025-08-12 PROCEDURE — 3079F DIAST BP 80-89 MM HG: CPT | Performed by: CLINICAL NURSE SPECIALIST

## 2025-08-12 SDOH — SOCIAL STABILITY: SOCIAL NETWORK: SOCIAL ACTIVITY:: 1

## 2025-08-12 ASSESSMENT — ENCOUNTER SYMPTOMS
DEPRESSION: 0
LOSS OF SENSATION IN FEET: 0
OCCASIONAL FEELINGS OF UNSTEADINESS: 0

## 2025-08-16 DIAGNOSIS — M54.16 LUMBAR RADICULOPATHY: ICD-10-CM

## 2025-08-18 RX ORDER — GABAPENTIN 300 MG/1
600 CAPSULE ORAL NIGHTLY
Qty: 180 CAPSULE | Refills: 3 | OUTPATIENT
Start: 2025-08-18

## 2025-08-20 ENCOUNTER — PATIENT MESSAGE (OUTPATIENT)
Dept: PAIN MEDICINE | Facility: HOSPITAL | Age: 60
End: 2025-08-20
Payer: COMMERCIAL

## 2025-08-20 DIAGNOSIS — M54.16 LUMBAR RADICULOPATHY: ICD-10-CM

## 2025-08-20 RX ORDER — GABAPENTIN 300 MG/1
600 CAPSULE ORAL NIGHTLY
Qty: 180 CAPSULE | Refills: 1 | Status: SHIPPED | OUTPATIENT
Start: 2025-08-20 | End: 2026-02-16

## 2025-11-13 ENCOUNTER — APPOINTMENT (OUTPATIENT)
Dept: PRIMARY CARE | Facility: CLINIC | Age: 60
End: 2025-11-13
Payer: COMMERCIAL